# Patient Record
Sex: MALE | Race: WHITE | Employment: FULL TIME | URBAN - METROPOLITAN AREA
[De-identification: names, ages, dates, MRNs, and addresses within clinical notes are randomized per-mention and may not be internally consistent; named-entity substitution may affect disease eponyms.]

---

## 2019-12-02 ENCOUNTER — TELEPHONE (OUTPATIENT)
Dept: INTERNAL MEDICINE CLINIC | Facility: CLINIC | Age: 64
End: 2019-12-02

## 2019-12-02 NOTE — TELEPHONE ENCOUNTER
Patient calling to re establish with Dr Rodrigo Daly please advise if he will reestablish with this patient

## 2019-12-03 NOTE — TELEPHONE ENCOUNTER
Fine with me. Bear Junior. Amanda Jones MD  Diplomate, American Board of Internal Medicine  Member, American College of [address] [facility]  [address] [facility], [address], [facility], [address] [place] [address]  T: [id number]; F: [address]

## 2019-12-19 NOTE — TELEPHONE ENCOUNTER
Called patient to set up first appointment as new patient with DR Zion Lira to set up [facility] per Dr Rosamaria Holliday

## 2020-01-30 ENCOUNTER — OFFICE VISIT (OUTPATIENT)
Dept: INTERNAL MEDICINE CLINIC | Facility: CLINIC | Age: 65
End: 2020-01-30
Payer: COMMERCIAL

## 2020-01-30 VITALS
HEART RATE: 102 BPM | WEIGHT: 315 LBS | DIASTOLIC BLOOD PRESSURE: 100 MMHG | HEIGHT: 71.5 IN | SYSTOLIC BLOOD PRESSURE: 142 MMHG | BODY MASS INDEX: 43.13 KG/M2 | OXYGEN SATURATION: 98 % | RESPIRATION RATE: 18 BRPM | TEMPERATURE: 99 F

## 2020-01-30 DIAGNOSIS — Z13.6 SCREENING FOR HEART DISEASE: ICD-10-CM

## 2020-01-30 DIAGNOSIS — Z00.00 ROUTINE GENERAL MEDICAL EXAMINATION AT A HEALTH CARE FACILITY: Primary | ICD-10-CM

## 2020-01-30 DIAGNOSIS — R03.0 ELEVATED BLOOD PRESSURE READING WITHOUT DIAGNOSIS OF HYPERTENSION: ICD-10-CM

## 2020-01-30 DIAGNOSIS — E66.1 CLASS 3 DRUG-INDUCED OBESITY WITHOUT SERIOUS COMORBIDITY WITH BODY MASS INDEX (BMI) OF 45.0 TO 49.9 IN ADULT (HCC): ICD-10-CM

## 2020-01-30 DIAGNOSIS — Z12.12 SCREENING FOR COLORECTAL CANCER: ICD-10-CM

## 2020-01-30 DIAGNOSIS — Z12.11 SCREENING FOR COLORECTAL CANCER: ICD-10-CM

## 2020-01-30 DIAGNOSIS — Z12.5 SCREENING PSA (PROSTATE SPECIFIC ANTIGEN): ICD-10-CM

## 2020-01-30 DIAGNOSIS — I87.2 CHRONIC VENOUS INSUFFICIENCY: ICD-10-CM

## 2020-01-30 PROBLEM — E66.813 CLASS 3 DRUG-INDUCED OBESITY WITHOUT SERIOUS COMORBIDITY WITH BODY MASS INDEX (BMI) OF 45.0 TO 49.9 IN ADULT (HCC): Status: ACTIVE | Noted: 2020-01-30

## 2020-01-30 PROCEDURE — 93000 ELECTROCARDIOGRAM COMPLETE: CPT | Performed by: INTERNAL MEDICINE

## 2020-01-30 PROCEDURE — 99386 PREV VISIT NEW AGE 40-64: CPT | Performed by: INTERNAL MEDICINE

## 2020-01-30 NOTE — PROGRESS NOTES
Patient presents with:  Establish Care      HPI: Bebe Rooney is a 59 y/o WM, new patient here to establish PCP and to undergo male CPX. Last saw me in 2011. Health goals center around longevity, vitality, and QOL. Due for wellness labs.     Review of Systems drug-induced obesity without serious comorbidity with body mass index (bmi) of 45.0 to 49.9 in adult (hcc)  Elevated blood pressure reading without diagnosis of hypertension  Chronic venous insufficiency    1. Male CPX - Check wellness labs.   2. HM/Prev -

## 2020-02-05 ENCOUNTER — LAB ENCOUNTER (OUTPATIENT)
Dept: LAB | Age: 65
End: 2020-02-05
Attending: INTERNAL MEDICINE
Payer: COMMERCIAL

## 2020-02-05 DIAGNOSIS — Z00.00 ROUTINE GENERAL MEDICAL EXAMINATION AT A HEALTH CARE FACILITY: ICD-10-CM

## 2020-02-05 DIAGNOSIS — Z12.5 SCREENING PSA (PROSTATE SPECIFIC ANTIGEN): ICD-10-CM

## 2020-02-05 LAB
ALBUMIN SERPL-MCNC: 3.4 G/DL (ref 3.4–5)
ALBUMIN/GLOB SERPL: 0.9 {RATIO} (ref 1–2)
ALP LIVER SERPL-CCNC: 78 U/L (ref 45–117)
ALT SERPL-CCNC: 23 U/L (ref 16–61)
ANION GAP SERPL CALC-SCNC: 4 MMOL/L (ref 0–18)
AST SERPL-CCNC: 17 U/L (ref 15–37)
BASOPHILS # BLD AUTO: 0.1 X10(3) UL (ref 0–0.2)
BASOPHILS NFR BLD AUTO: 1.1 %
BILIRUB SERPL-MCNC: 0.3 MG/DL (ref 0.1–2)
BILIRUB UR QL STRIP.AUTO: NEGATIVE
BUN BLD-MCNC: 11 MG/DL (ref 7–18)
BUN/CREAT SERPL: 14.3 (ref 10–20)
CALCIUM BLD-MCNC: 9.1 MG/DL (ref 8.5–10.1)
CHLORIDE SERPL-SCNC: 106 MMOL/L (ref 98–112)
CHOLEST SMN-MCNC: 157 MG/DL (ref ?–200)
CLARITY UR REFRACT.AUTO: CLEAR
CO2 SERPL-SCNC: 30 MMOL/L (ref 21–32)
COLOR UR AUTO: YELLOW
COMPLEXED PSA SERPL-MCNC: 0.89 NG/ML (ref ?–4)
CREAT BLD-MCNC: 0.77 MG/DL (ref 0.7–1.3)
DEPRECATED RDW RBC AUTO: 48.4 FL (ref 35.1–46.3)
EOSINOPHIL # BLD AUTO: 0.32 X10(3) UL (ref 0–0.7)
EOSINOPHIL NFR BLD AUTO: 3.6 %
ERYTHROCYTE [DISTWIDTH] IN BLOOD BY AUTOMATED COUNT: 13.9 % (ref 11–15)
EST. AVERAGE GLUCOSE BLD GHB EST-MCNC: 183 MG/DL (ref 68–126)
GLOBULIN PLAS-MCNC: 4 G/DL (ref 2.8–4.4)
GLUCOSE BLD-MCNC: 107 MG/DL (ref 70–99)
GLUCOSE UR STRIP.AUTO-MCNC: NEGATIVE MG/DL
HBA1C MFR BLD HPLC: 8 % (ref ?–5.7)
HCT VFR BLD AUTO: 49 % (ref 39–53)
HDLC SERPL-MCNC: 33 MG/DL (ref 40–59)
HGB BLD-MCNC: 15.9 G/DL (ref 13–17.5)
IMM GRANULOCYTES # BLD AUTO: 0.05 X10(3) UL (ref 0–1)
IMM GRANULOCYTES NFR BLD: 0.6 %
KETONES UR STRIP.AUTO-MCNC: NEGATIVE MG/DL
LDLC SERPL CALC-MCNC: 84 MG/DL (ref ?–100)
LEUKOCYTE ESTERASE UR QL STRIP.AUTO: NEGATIVE
LYMPHOCYTES # BLD AUTO: 2.39 X10(3) UL (ref 1–4)
LYMPHOCYTES NFR BLD AUTO: 26.6 %
M PROTEIN MFR SERPL ELPH: 7.4 G/DL (ref 6.4–8.2)
MCH RBC QN AUTO: 30.7 PG (ref 26–34)
MCHC RBC AUTO-ENTMCNC: 32.4 G/DL (ref 31–37)
MCV RBC AUTO: 94.6 FL (ref 80–100)
MONOCYTES # BLD AUTO: 1.2 X10(3) UL (ref 0.1–1)
MONOCYTES NFR BLD AUTO: 13.4 %
NEUTROPHILS # BLD AUTO: 4.91 X10 (3) UL (ref 1.5–7.7)
NEUTROPHILS # BLD AUTO: 4.91 X10(3) UL (ref 1.5–7.7)
NEUTROPHILS NFR BLD AUTO: 54.7 %
NITRITE UR QL STRIP.AUTO: NEGATIVE
NONHDLC SERPL-MCNC: 124 MG/DL (ref ?–130)
OSMOLALITY SERPL CALC.SUM OF ELEC: 290 MOSM/KG (ref 275–295)
PATIENT FASTING Y/N/NP: YES
PATIENT FASTING Y/N/NP: YES
PH UR STRIP.AUTO: 5 [PH] (ref 4.5–8)
PLATELET # BLD AUTO: 229 10(3)UL (ref 150–450)
POTASSIUM SERPL-SCNC: 4.6 MMOL/L (ref 3.5–5.1)
PROT UR STRIP.AUTO-MCNC: 100 MG/DL
RBC # BLD AUTO: 5.18 X10(6)UL (ref 4.3–5.7)
RBC UR QL AUTO: NEGATIVE
SODIUM SERPL-SCNC: 140 MMOL/L (ref 136–145)
SP GR UR STRIP.AUTO: 1.02 (ref 1–1.03)
TRIGL SERPL-MCNC: 200 MG/DL (ref 30–149)
TSI SER-ACNC: 1.21 MIU/ML (ref 0.36–3.74)
UROBILINOGEN UR STRIP.AUTO-MCNC: <2 MG/DL
VIT D+METAB SERPL-MCNC: 11.7 NG/ML (ref 30–100)
VLDLC SERPL CALC-MCNC: 40 MG/DL (ref 0–30)
WBC # BLD AUTO: 9 X10(3) UL (ref 4–11)

## 2020-02-05 PROCEDURE — 36415 COLL VENOUS BLD VENIPUNCTURE: CPT | Performed by: INTERNAL MEDICINE

## 2020-02-05 PROCEDURE — 80050 GENERAL HEALTH PANEL: CPT | Performed by: INTERNAL MEDICINE

## 2020-02-05 PROCEDURE — 81001 URINALYSIS AUTO W/SCOPE: CPT | Performed by: INTERNAL MEDICINE

## 2020-02-05 PROCEDURE — 83036 HEMOGLOBIN GLYCOSYLATED A1C: CPT | Performed by: INTERNAL MEDICINE

## 2020-02-05 PROCEDURE — 82306 VITAMIN D 25 HYDROXY: CPT | Performed by: INTERNAL MEDICINE

## 2020-02-05 PROCEDURE — 84153 ASSAY OF PSA TOTAL: CPT | Performed by: INTERNAL MEDICINE

## 2020-02-05 PROCEDURE — 80061 LIPID PANEL: CPT | Performed by: INTERNAL MEDICINE

## 2020-02-21 ENCOUNTER — OFFICE VISIT (OUTPATIENT)
Dept: INTERNAL MEDICINE CLINIC | Facility: CLINIC | Age: 65
End: 2020-02-21
Payer: COMMERCIAL

## 2020-02-21 VITALS
HEIGHT: 71.5 IN | WEIGHT: 315 LBS | HEART RATE: 96 BPM | BODY MASS INDEX: 43.13 KG/M2 | SYSTOLIC BLOOD PRESSURE: 150 MMHG | DIASTOLIC BLOOD PRESSURE: 74 MMHG | TEMPERATURE: 99 F

## 2020-02-21 DIAGNOSIS — E11.9 NEW ONSET TYPE 2 DIABETES MELLITUS (HCC): Primary | ICD-10-CM

## 2020-02-21 PROCEDURE — 99214 OFFICE O/P EST MOD 30 MIN: CPT | Performed by: INTERNAL MEDICINE

## 2020-02-21 NOTE — PATIENT INSTRUCTIONS
General Information on Diabetes  Diabetes is a long-term health problem. It means your body doesn't make enough insulin. Or it may mean that your body can't use the insulin it makes. Insulin is a hormone in your body.  It lets blood sugar (glucose) reach · Try to reach your ideal weight. You may be able to cut back on or not have to take diabetes medicine if you eat the right foods and get exercise. · Don't smoke. Smoking worsens the effects of diabetes on your circulation.  You are much more likely to hav · Check your ketones often. Watch them more often if you are vomiting and having diarrhea. · Don't skip meals. Try to eat small meals on a regular schedule. Do this even if you don't feel like eating.   · Drink water or other liquids that don't have caffei · Urinating often  · Drowsiness  · Thirst  · Headache  · Nausea or vomiting  · Belly (abdominal) pain  · Eyesight changes  · Fast breathing  Also call your provider right away if you have any of these signs of low blood sugar and they don't go away with th

## 2020-02-21 NOTE — PROGRESS NOTES
Family History   Problem Relation Age of Onset   • Diabetes Mother    • Other (Other) Mother         Thyroid     Patient presents with:  Lab Results      HPI: Jayshree Wheatley presents today for abnormal labs, namely new-onset DM2.   A1c done recently w/ result a detailed. Handout given. Send to Diabetes Education. Push aggressive lifestyle measures. Will reassess labs in 3 months and decide on medication options at that time. Send to [facility] (Dr. Saul Alexander) and contact info given. 2. RTC 3 months.   Tot

## 2020-02-24 ENCOUNTER — OFFICE VISIT (OUTPATIENT)
Dept: SURGERY | Facility: CLINIC | Age: 65
End: 2020-02-24
Payer: COMMERCIAL

## 2020-02-24 VITALS
DIASTOLIC BLOOD PRESSURE: 80 MMHG | SYSTOLIC BLOOD PRESSURE: 151 MMHG | TEMPERATURE: 98 F | WEIGHT: 315 LBS | BODY MASS INDEX: 43.13 KG/M2 | HEART RATE: 97 BPM | RESPIRATION RATE: 17 BRPM | HEIGHT: 71.5 IN

## 2020-02-24 DIAGNOSIS — I87.2 CHRONIC VENOUS INSUFFICIENCY: ICD-10-CM

## 2020-02-24 DIAGNOSIS — R03.0 ELEVATED BLOOD PRESSURE READING WITHOUT DIAGNOSIS OF HYPERTENSION: ICD-10-CM

## 2020-02-24 DIAGNOSIS — Z12.11 ENCOUNTER FOR SCREENING COLONOSCOPY: Primary | ICD-10-CM

## 2020-02-24 DIAGNOSIS — E11.9 NEW ONSET TYPE 2 DIABETES MELLITUS (HCC): ICD-10-CM

## 2020-02-24 DIAGNOSIS — E66.1 CLASS 3 DRUG-INDUCED OBESITY WITHOUT SERIOUS COMORBIDITY WITH BODY MASS INDEX (BMI) OF 45.0 TO 49.9 IN ADULT (HCC): ICD-10-CM

## 2020-02-24 PROCEDURE — S0285 CNSLT BEFORE SCREEN COLONOSC: HCPCS | Performed by: COLON & RECTAL SURGERY

## 2020-02-24 RX ORDER — POLYETHYLENE GLYCOL 3350, SODIUM CHLORIDE, SODIUM BICARBONATE, POTASSIUM CHLORIDE 420; 11.2; 5.72; 1.48 G/4L; G/4L; G/4L; G/4L
POWDER, FOR SOLUTION ORAL
Qty: 1 BOTTLE | Refills: 0 | Status: SHIPPED | OUTPATIENT
Start: 2020-02-24 | End: 2020-05-22 | Stop reason: ALTCHOICE

## 2020-02-24 NOTE — H&P
New Patient Visit Note       Active Problems      1. Encounter for screening colonoscopy    2. New onset type 2 diabetes mellitus ([facility] [address]    3. Chronic venous insufficiency    4.  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 No current facility-administered medications on file prior to visit.          Review of Systems  Review range of motion. General: No edema. Lymphadenopathy:     He has no cervical adenopathy. Neurological: He is alert and oriented to person, place, and time. Skin: Skin is warm and dry. No rash noted. He is not diaphoretic.    Psychiatric: He has

## 2020-02-24 NOTE — PATIENT INSTRUCTIONS
Assessment   Encounter for screening colonoscopy  (primary encounter diagnosis)  New onset type 2 diabetes mellitus ([facility] [address]  Chronic venous insufficiency  Class 3 drug-induced obesity without serious comorbidity with body mass index (BMI) of 45.0 to 49.9 in

## 2020-03-06 ENCOUNTER — TELEPHONE (OUTPATIENT)
Dept: ENDOCRINOLOGY CLINIC | Facility: CLINIC | Age: 65
End: 2020-03-06

## 2020-03-06 ENCOUNTER — NURSE ONLY (OUTPATIENT)
Dept: ENDOCRINOLOGY CLINIC | Facility: CLINIC | Age: 65
End: 2020-03-06
Payer: COMMERCIAL

## 2020-03-06 VITALS — HEIGHT: 71.5 IN | WEIGHT: 315 LBS | BODY MASS INDEX: 43.13 KG/M2

## 2020-03-06 DIAGNOSIS — E11.65 TYPE 2 DIABETES MELLITUS WITH HYPERGLYCEMIA, WITHOUT LONG-TERM CURRENT USE OF INSULIN (HCC): Primary | ICD-10-CM

## 2020-03-06 PROCEDURE — G0108 DIAB MANAGE TRN  PER INDIV: HCPCS

## 2020-03-06 RX ORDER — BLOOD SUGAR DIAGNOSTIC
STRIP MISCELLANEOUS
Qty: 100 STRIP | Refills: 1 | Status: SHIPPED | OUTPATIENT
Start: 2020-03-06 | End: 2020-03-09

## 2020-03-06 RX ORDER — LANCETS 33 GAUGE
1 EACH MISCELLANEOUS 4 TIMES DAILY
Qty: 3 BOX | Refills: 11 | Status: SHIPPED | OUTPATIENT
Start: 2020-03-06 | End: 2020-05-22 | Stop reason: ALTCHOICE

## 2020-03-06 RX ORDER — FLASH GLUCOSE SENSOR
1 KIT MISCELLANEOUS
Qty: 6 EACH | Refills: 3 | Status: SHIPPED | OUTPATIENT
Start: 2020-03-06 | End: 2021-06-09

## 2020-03-06 NOTE — TELEPHONE ENCOUNTER
Please call [place] [phone]:     1) Does pt need Irwin reader  2) Does he also need O.T. Verio supplies and if so how many times/day to test?

## 2020-03-06 NOTE — PROGRESS NOTES
Julio Cesar Michel  : 1955 attended Step 1 Diabetic Education:    Date: 3/6/2020  Referring Provider: Alejandra Sosa  Start time: 10am End time: 11am    Ht 71.5\"   Wt (!) 324 lb (147 kg)   BMI 44.56 kg/m²     HgbA1C (%)   Date Value   2020 8.0 (H)

## 2020-03-09 ENCOUNTER — TELEPHONE (OUTPATIENT)
Dept: INTERNAL MEDICINE CLINIC | Facility: CLINIC | Age: 65
End: 2020-03-09

## 2020-03-09 DIAGNOSIS — E11.65 TYPE 2 DIABETES MELLITUS WITH HYPERGLYCEMIA, WITHOUT LONG-TERM CURRENT USE OF INSULIN (HCC): ICD-10-CM

## 2020-03-09 RX ORDER — BLOOD SUGAR DIAGNOSTIC
STRIP MISCELLANEOUS
Qty: 200 STRIP | Refills: 1 | Status: SHIPPED | OUTPATIENT
Start: 2020-03-09 | End: 2020-05-22 | Stop reason: ALTCHOICE

## 2020-03-25 ENCOUNTER — TELEPHONE (OUTPATIENT)
Dept: ENDOCRINOLOGY CLINIC | Facility: CLINIC | Age: 65
End: 2020-03-25

## 2020-03-25 NOTE — TELEPHONE ENCOUNTER
LM Step 2 Class is being canceled due to COVID-19. Patient added to call list to be rescheduled once classes resume.

## 2020-05-22 ENCOUNTER — OFFICE VISIT (OUTPATIENT)
Dept: INTERNAL MEDICINE CLINIC | Facility: CLINIC | Age: 65
End: 2020-05-22
Payer: COMMERCIAL

## 2020-05-22 VITALS
RESPIRATION RATE: 16 BRPM | DIASTOLIC BLOOD PRESSURE: 88 MMHG | SYSTOLIC BLOOD PRESSURE: 140 MMHG | TEMPERATURE: 99 F | HEIGHT: 71.5 IN | WEIGHT: 315 LBS | HEART RATE: 98 BPM | BODY MASS INDEX: 43.13 KG/M2 | OXYGEN SATURATION: 98 %

## 2020-05-22 DIAGNOSIS — R40.0 DAYTIME SOMNOLENCE: ICD-10-CM

## 2020-05-22 DIAGNOSIS — E11.9 CONTROLLED TYPE 2 DIABETES MELLITUS WITHOUT COMPLICATION, WITHOUT LONG-TERM CURRENT USE OF INSULIN (HCC): Primary | ICD-10-CM

## 2020-05-22 DIAGNOSIS — R06.83 PRIMARY SNORING: ICD-10-CM

## 2020-05-22 DIAGNOSIS — E66.1 CLASS 3 DRUG-INDUCED OBESITY WITHOUT SERIOUS COMORBIDITY WITH BODY MASS INDEX (BMI) OF 40.0 TO 44.9 IN ADULT (HCC): ICD-10-CM

## 2020-05-22 PROBLEM — E66.813 CLASS 3 DRUG-INDUCED OBESITY WITHOUT SERIOUS COMORBIDITY WITH BODY MASS INDEX (BMI) OF 40.0 TO 44.9 IN ADULT (HCC): Status: ACTIVE | Noted: 2020-01-30

## 2020-05-22 PROCEDURE — 99214 OFFICE O/P EST MOD 30 MIN: CPT | Performed by: INTERNAL MEDICINE

## 2020-05-22 NOTE — PROGRESS NOTES
Patient presents with: Follow - Up: 3-months for diabetes      HPI: Coleman Scheuermann presents for 3-month f/u diabetes. He's been compliant w/ prescription medication + lifestyle measures. Has had relatively good home BG control. Due for updated labs.     ROS: No CP mood/affect      A/P:  Controlled type 2 diabetes mellitus without complication, without long-term current use of insulin (hcc)  (primary encounter diagnosis)  Daytime somnolence  Primary snoring  Class 3 drug-induced obesity without serious comorbidity wi

## 2020-06-09 ENCOUNTER — OFFICE VISIT (OUTPATIENT)
Dept: SLEEP CENTER | Age: 65
End: 2020-06-09
Attending: INTERNAL MEDICINE
Payer: COMMERCIAL

## 2020-06-09 DIAGNOSIS — E11.9 CONTROLLED TYPE 2 DIABETES MELLITUS WITHOUT COMPLICATION, WITHOUT LONG-TERM CURRENT USE OF INSULIN (HCC): ICD-10-CM

## 2020-06-09 DIAGNOSIS — R06.83 PRIMARY SNORING: ICD-10-CM

## 2020-06-09 DIAGNOSIS — R40.0 DAYTIME SOMNOLENCE: ICD-10-CM

## 2020-06-09 PROCEDURE — 95806 SLEEP STUDY UNATT&RESP EFFT: CPT

## 2020-06-15 NOTE — PROCEDURES
[address] [address]       Accredited by the [facility] ([facility])    PATIENT'S NAME:        Yaz Velazquez  ATTENDING PHYSICIAN:   America Knott M.D. REFERRING PHYSICIAN:   MONTEZ Zaidi severe obstructive sleep apnea with associated oxyhemoglobin desaturations. RECOMMENDATIONS:    1.    Given the severity of sleep-disordered breathing, it is recommended the patient pursue definitive therapy by undergoing CPAP desensitization followed by

## 2020-07-23 ENCOUNTER — TELEPHONE (OUTPATIENT)
Dept: INTERNAL MEDICINE CLINIC | Facility: CLINIC | Age: 65
End: 2020-07-23

## 2020-07-23 NOTE — TELEPHONE ENCOUNTER
Abebe Ellison calling from Dr Keegan Jones office they would like most recent A1C + most recent office notes faxed to their office at f [phone]

## 2020-08-03 ENCOUNTER — TELEPHONE (OUTPATIENT)
Dept: INTERNAL MEDICINE CLINIC | Facility: CLINIC | Age: 65
End: 2020-08-03

## 2020-12-15 ENCOUNTER — OFFICE VISIT (OUTPATIENT)
Dept: INTERNAL MEDICINE CLINIC | Facility: CLINIC | Age: 65
End: 2020-12-15
Payer: COMMERCIAL

## 2020-12-15 VITALS
HEIGHT: 71.75 IN | SYSTOLIC BLOOD PRESSURE: 180 MMHG | WEIGHT: 315 LBS | DIASTOLIC BLOOD PRESSURE: 90 MMHG | BODY MASS INDEX: 43.13 KG/M2 | HEART RATE: 100 BPM | RESPIRATION RATE: 16 BRPM | TEMPERATURE: 99 F | OXYGEN SATURATION: 99 %

## 2020-12-15 DIAGNOSIS — Z00.00 LABORATORY EXAMINATION ORDERED AS PART OF A ROUTINE GENERAL MEDICAL EXAMINATION: ICD-10-CM

## 2020-12-15 DIAGNOSIS — I87.2 CHRONIC VENOUS INSUFFICIENCY: Primary | ICD-10-CM

## 2020-12-15 DIAGNOSIS — E55.9 VITAMIN D DEFICIENCY: ICD-10-CM

## 2020-12-15 DIAGNOSIS — I10 ESSENTIAL HYPERTENSION: ICD-10-CM

## 2020-12-15 DIAGNOSIS — E11.9 CONTROLLED TYPE 2 DIABETES MELLITUS WITHOUT COMPLICATION, WITHOUT LONG-TERM CURRENT USE OF INSULIN (HCC): ICD-10-CM

## 2020-12-15 PROCEDURE — 99214 OFFICE O/P EST MOD 30 MIN: CPT | Performed by: NURSE PRACTITIONER

## 2020-12-15 PROCEDURE — 3008F BODY MASS INDEX DOCD: CPT | Performed by: NURSE PRACTITIONER

## 2020-12-15 PROCEDURE — 3080F DIAST BP >= 90 MM HG: CPT | Performed by: NURSE PRACTITIONER

## 2020-12-15 PROCEDURE — 3077F SYST BP >= 140 MM HG: CPT | Performed by: NURSE PRACTITIONER

## 2020-12-15 NOTE — PROGRESS NOTES
CHIEF COMPLAINT:     Patient presents with:  Leg Pain: and skin concern located on lower legs. HPI:   Rosaroi Conde is a 63year old male here for pain and swelling in his legs. Pt states he has had this problem since the beginning of the year.  Hannah Rivera Cuff Size: large)   Pulse 100   Temp 98.8 °F (37.1 °C) (Oral)   Resp 16   Ht 5' 11.75\" (1.822 m)   Wt (!) 327 lb 12 oz (148.7 kg)   SpO2 99%   BMI 44.76 kg/m²   GENERAL: well developed, well nourished,in no apparent distress  SKIN: no rashes,no suspicious

## 2021-01-07 ENCOUNTER — LAB ENCOUNTER (OUTPATIENT)
Dept: LAB | Age: 66
End: 2021-01-07
Attending: NURSE PRACTITIONER
Payer: COMMERCIAL

## 2021-01-07 DIAGNOSIS — E11.9 NEW ONSET TYPE 2 DIABETES MELLITUS (HCC): ICD-10-CM

## 2021-01-07 DIAGNOSIS — E55.9 VITAMIN D DEFICIENCY: ICD-10-CM

## 2021-01-07 DIAGNOSIS — I10 ESSENTIAL HYPERTENSION: ICD-10-CM

## 2021-01-07 DIAGNOSIS — E11.9 CONTROLLED TYPE 2 DIABETES MELLITUS WITHOUT COMPLICATION, WITHOUT LONG-TERM CURRENT USE OF INSULIN (HCC): ICD-10-CM

## 2021-01-07 DIAGNOSIS — Z00.00 LABORATORY EXAMINATION ORDERED AS PART OF A ROUTINE GENERAL MEDICAL EXAMINATION: ICD-10-CM

## 2021-01-07 LAB
ALBUMIN SERPL-MCNC: 3.3 G/DL (ref 3.4–5)
ALBUMIN/GLOB SERPL: 1 {RATIO} (ref 1–2)
ALP LIVER SERPL-CCNC: 83 U/L
ALT SERPL-CCNC: 22 U/L
ANION GAP SERPL CALC-SCNC: 5 MMOL/L (ref 0–18)
AST SERPL-CCNC: 14 U/L (ref 15–37)
BASOPHILS # BLD AUTO: 0.08 X10(3) UL (ref 0–0.2)
BASOPHILS NFR BLD AUTO: 0.9 %
BILIRUB SERPL-MCNC: 0.3 MG/DL (ref 0.1–2)
BUN BLD-MCNC: 8 MG/DL (ref 7–18)
BUN/CREAT SERPL: 11.3 (ref 10–20)
CALCIUM BLD-MCNC: 8.9 MG/DL (ref 8.5–10.1)
CHLORIDE SERPL-SCNC: 102 MMOL/L (ref 98–112)
CHOLEST SMN-MCNC: 208 MG/DL (ref ?–200)
CO2 SERPL-SCNC: 29 MMOL/L (ref 21–32)
CREAT BLD-MCNC: 0.71 MG/DL
DEPRECATED RDW RBC AUTO: 43.5 FL (ref 35.1–46.3)
EOSINOPHIL # BLD AUTO: 0.4 X10(3) UL (ref 0–0.7)
EOSINOPHIL NFR BLD AUTO: 4.6 %
ERYTHROCYTE [DISTWIDTH] IN BLOOD BY AUTOMATED COUNT: 13.2 % (ref 11–15)
EST. AVERAGE GLUCOSE BLD GHB EST-MCNC: 206 MG/DL (ref 68–126)
GLOBULIN PLAS-MCNC: 3.4 G/DL (ref 2.8–4.4)
GLUCOSE BLD-MCNC: 225 MG/DL (ref 70–99)
HBA1C MFR BLD HPLC: 8.8 % (ref ?–5.7)
HCT VFR BLD AUTO: 44.3 %
HDLC SERPL-MCNC: 38 MG/DL (ref 40–59)
HGB BLD-MCNC: 15 G/DL
IMM GRANULOCYTES # BLD AUTO: 0.06 X10(3) UL (ref 0–1)
IMM GRANULOCYTES NFR BLD: 0.7 %
LDLC SERPL CALC-MCNC: 109 MG/DL (ref ?–100)
LYMPHOCYTES # BLD AUTO: 2.34 X10(3) UL (ref 1–4)
LYMPHOCYTES NFR BLD AUTO: 27.1 %
M PROTEIN MFR SERPL ELPH: 6.7 G/DL (ref 6.4–8.2)
MCH RBC QN AUTO: 30.4 PG (ref 26–34)
MCHC RBC AUTO-ENTMCNC: 33.9 G/DL (ref 31–37)
MCV RBC AUTO: 89.7 FL
MONOCYTES # BLD AUTO: 0.98 X10(3) UL (ref 0.1–1)
MONOCYTES NFR BLD AUTO: 11.4 %
NEUTROPHILS # BLD AUTO: 4.77 X10 (3) UL (ref 1.5–7.7)
NEUTROPHILS # BLD AUTO: 4.77 X10(3) UL (ref 1.5–7.7)
NEUTROPHILS NFR BLD AUTO: 55.3 %
NONHDLC SERPL-MCNC: 170 MG/DL (ref ?–130)
OSMOLALITY SERPL CALC.SUM OF ELEC: 287 MOSM/KG (ref 275–295)
PATIENT FASTING Y/N/NP: YES
PATIENT FASTING Y/N/NP: YES
PLATELET # BLD AUTO: 203 10(3)UL (ref 150–450)
POTASSIUM SERPL-SCNC: 4.6 MMOL/L (ref 3.5–5.1)
RBC # BLD AUTO: 4.94 X10(6)UL
SODIUM SERPL-SCNC: 136 MMOL/L (ref 136–145)
TRIGL SERPL-MCNC: 307 MG/DL (ref 30–149)
VIT D+METAB SERPL-MCNC: 9.6 NG/ML (ref 30–100)
VLDLC SERPL CALC-MCNC: 61 MG/DL (ref 0–30)
WBC # BLD AUTO: 8.6 X10(3) UL (ref 4–11)

## 2021-01-07 PROCEDURE — 83036 HEMOGLOBIN GLYCOSYLATED A1C: CPT

## 2021-01-07 PROCEDURE — 80061 LIPID PANEL: CPT

## 2021-01-07 PROCEDURE — 82306 VITAMIN D 25 HYDROXY: CPT

## 2021-01-07 PROCEDURE — 36415 COLL VENOUS BLD VENIPUNCTURE: CPT

## 2021-01-07 PROCEDURE — 85025 COMPLETE CBC W/AUTO DIFF WBC: CPT

## 2021-01-07 PROCEDURE — 3052F HG A1C>EQUAL 8.0%<EQUAL 9.0%: CPT | Performed by: FAMILY MEDICINE

## 2021-01-07 PROCEDURE — 80053 COMPREHEN METABOLIC PANEL: CPT

## 2021-03-20 DIAGNOSIS — Z23 NEED FOR VACCINATION: ICD-10-CM

## 2021-04-20 ENCOUNTER — OFFICE VISIT (OUTPATIENT)
Dept: INTERNAL MEDICINE CLINIC | Facility: CLINIC | Age: 66
End: 2021-04-20
Payer: COMMERCIAL

## 2021-04-20 VITALS
WEIGHT: 315 LBS | RESPIRATION RATE: 17 BRPM | BODY MASS INDEX: 42.66 KG/M2 | HEART RATE: 98 BPM | DIASTOLIC BLOOD PRESSURE: 94 MMHG | SYSTOLIC BLOOD PRESSURE: 168 MMHG | HEIGHT: 72 IN | TEMPERATURE: 99 F | OXYGEN SATURATION: 99 %

## 2021-04-20 DIAGNOSIS — Z00.00 LABORATORY EXAMINATION ORDERED AS PART OF A ROUTINE GENERAL MEDICAL EXAMINATION: ICD-10-CM

## 2021-04-20 DIAGNOSIS — E55.9 VITAMIN D DEFICIENCY: ICD-10-CM

## 2021-04-20 DIAGNOSIS — I10 ESSENTIAL HYPERTENSION: ICD-10-CM

## 2021-04-20 DIAGNOSIS — E11.65 UNCONTROLLED TYPE 2 DIABETES MELLITUS WITH HYPERGLYCEMIA (HCC): Primary | ICD-10-CM

## 2021-04-20 DIAGNOSIS — E66.1 CLASS 3 DRUG-INDUCED OBESITY WITH SERIOUS COMORBIDITY AND BODY MASS INDEX (BMI) OF 40.0 TO 44.9 IN ADULT (HCC): ICD-10-CM

## 2021-04-20 DIAGNOSIS — E78.00 PURE HYPERCHOLESTEROLEMIA: ICD-10-CM

## 2021-04-20 DIAGNOSIS — G47.30 SLEEP APNEA, UNSPECIFIED TYPE: ICD-10-CM

## 2021-04-20 DIAGNOSIS — Z00.00 ROUTINE GENERAL MEDICAL EXAMINATION AT A HEALTH CARE FACILITY: ICD-10-CM

## 2021-04-20 DIAGNOSIS — Z12.5 PROSTATE CANCER SCREENING: ICD-10-CM

## 2021-04-20 PROCEDURE — 3080F DIAST BP >= 90 MM HG: CPT | Performed by: NURSE PRACTITIONER

## 2021-04-20 PROCEDURE — 3008F BODY MASS INDEX DOCD: CPT | Performed by: NURSE PRACTITIONER

## 2021-04-20 PROCEDURE — 99214 OFFICE O/P EST MOD 30 MIN: CPT | Performed by: NURSE PRACTITIONER

## 2021-04-20 PROCEDURE — 99396 PREV VISIT EST AGE 40-64: CPT | Performed by: NURSE PRACTITIONER

## 2021-04-20 PROCEDURE — 3077F SYST BP >= 140 MM HG: CPT | Performed by: NURSE PRACTITIONER

## 2021-04-20 RX ORDER — ERGOCALCIFEROL 1.25 MG/1
50000 CAPSULE ORAL WEEKLY
Qty: 12 CAPSULE | Refills: 1 | Status: CANCELLED | OUTPATIENT
Start: 2021-04-20

## 2021-04-20 RX ORDER — METFORMIN HYDROCHLORIDE 500 MG/1
TABLET, EXTENDED RELEASE ORAL
Qty: 180 TABLET | Refills: 0 | Status: CANCELLED | OUTPATIENT
Start: 2021-04-20

## 2021-04-20 RX ORDER — ROSUVASTATIN CALCIUM 20 MG/1
20 TABLET, COATED ORAL NIGHTLY
Qty: 90 TABLET | Refills: 0 | Status: CANCELLED | OUTPATIENT
Start: 2021-04-20

## 2021-04-20 NOTE — PROGRESS NOTES
Fátima Quintana is a 63year old male who presents for a complete physical exam.   HPI:   Pt complains of none. The patient presents for recheck of his diabetes. Patient’s FBS have been less than 150 per [place]. Pt purchasing sensors out of pocket.  Has tar Component Value Date    HDL 38 (L) 01/07/2021    HDL 33 (L) 02/05/2020     Lab Results   Component Value Date     (H) 01/07/2021    LDL 84 02/05/2020     Lab Results   Component Value Date    AST 14 (L) 01/07/2021    AST 17 02/05/2020     Lab Resu well developed, well nourished,in no apparent distress  SKIN: no rashes,no suspicious lesions  HEENT: atraumatic, normocephalic,ears and throat are clear  EYES:PERRLA, EOMI, normal optic disk,conjunctiva are clear  NECK: supple,no adenopathy, no thyromegal MANAGEMENT 3 HRS    5. Pure hypercholesterolemia  Strongly encouraged statin therapy, ASCVD risk nearly 40%! Pt again refuses and will research further and consider. Is aware of risks of not starting medical therapy. - LIPID PANEL; Future    6.  Prostate

## 2021-04-20 NOTE — PATIENT INSTRUCTIONS
1. Review medications for diabetes including Metformin, and class known as EBZ-3'M (Ozempic, trulicity, Rybelsus). There are several other oral medications to be considered as well but where your current A1C is we need at least 2 agents.    2. Research th loss of toes, feet, or limbs. How to prevent complications  You can prevent these serious problems by managing your blood sugar, blood pressure, and cholesterol. This can help you feel better and stay healthy.  You can control diabetes by tracking your blo between beats. Both the systolic and diastolic pressures are recorded as mm Hg (millimeters of mercury). Blood pressure is grouped as normal, elevated, or stage 1 or stage 2 high blood pressure:   · Normal blood pressure.  Lower than 120/lower than 80  · Pressure    Metabolic syndrome is a set of 5 health factors that can lead to serious health problems. The factors greatly increase your risk for diabetes, heart attack, or stroke.  High blood pressure (hypertension) is one of the factors of metabolic syndro foods, and drink less alcohol. · Eating less salt (sodium). Salt can raise blood pressure. Try salt-free seasoning, or herbs and spices. Choose low-salt or no-salt snacks, deli meats, and canned foods. · Being more physically active.  Physical activity ca asleep during the day, and having problems with memory or concentration.    Risk factors for sleep apnea include:  · Being overweight  · Being a man, or a woman in menopause  · Smoking  · Using alcohol or sedating medicines  · Having enlarged structures in have certain health problems. These include high blood pressure, heart attack, stroke, and sexual dysfunction. If you have sleep apnea, talk with your healthcare provider about the best treatments for you.    Sammy last reviewed this educational content

## 2021-04-26 ENCOUNTER — LAB ENCOUNTER (OUTPATIENT)
Dept: LAB | Age: 66
End: 2021-04-26
Attending: FAMILY MEDICINE
Payer: COMMERCIAL

## 2021-04-26 DIAGNOSIS — Z12.5 PROSTATE CANCER SCREENING: ICD-10-CM

## 2021-04-26 DIAGNOSIS — E11.65 UNCONTROLLED TYPE 2 DIABETES MELLITUS WITH HYPERGLYCEMIA (HCC): ICD-10-CM

## 2021-04-26 DIAGNOSIS — Z00.00 LABORATORY EXAMINATION ORDERED AS PART OF A ROUTINE GENERAL MEDICAL EXAMINATION: ICD-10-CM

## 2021-04-26 DIAGNOSIS — E78.00 PURE HYPERCHOLESTEROLEMIA: ICD-10-CM

## 2021-04-26 PROCEDURE — 84153 ASSAY OF PSA TOTAL: CPT | Performed by: NURSE PRACTITIONER

## 2021-04-26 PROCEDURE — 80050 GENERAL HEALTH PANEL: CPT | Performed by: NURSE PRACTITIONER

## 2021-04-26 PROCEDURE — 3060F POS MICROALBUMINURIA REV: CPT | Performed by: FAMILY MEDICINE

## 2021-04-26 PROCEDURE — 82043 UR ALBUMIN QUANTITATIVE: CPT | Performed by: NURSE PRACTITIONER

## 2021-04-26 PROCEDURE — 83036 HEMOGLOBIN GLYCOSYLATED A1C: CPT | Performed by: NURSE PRACTITIONER

## 2021-04-26 PROCEDURE — 81001 URINALYSIS AUTO W/SCOPE: CPT | Performed by: NURSE PRACTITIONER

## 2021-04-26 PROCEDURE — 82570 ASSAY OF URINE CREATININE: CPT | Performed by: NURSE PRACTITIONER

## 2021-04-26 PROCEDURE — 3044F HG A1C LEVEL LT 7.0%: CPT | Performed by: FAMILY MEDICINE

## 2021-04-26 PROCEDURE — 80061 LIPID PANEL: CPT | Performed by: NURSE PRACTITIONER

## 2021-06-05 NOTE — TELEPHONE ENCOUNTER
Protocol passed  Medication(s) to Refill:   Requested Prescriptions     Pending Prescriptions Disp Refills   • LISINOPRIL 20 MG Oral Tab [Pharmacy Med Name: LISINOPRIL 20MG TABLETS] 30 tablet 0     Sig: TAKE 1 TABLET(20 MG) BY MOUTH DAILY       LOV: 4/20/2

## 2021-06-07 RX ORDER — LISINOPRIL 20 MG/1
TABLET ORAL
Qty: 30 TABLET | Refills: 0 | Status: SHIPPED | OUTPATIENT
Start: 2021-06-07 | End: 2021-06-22

## 2021-06-08 DIAGNOSIS — E11.65 TYPE 2 DIABETES MELLITUS WITH HYPERGLYCEMIA, WITHOUT LONG-TERM CURRENT USE OF INSULIN (HCC): ICD-10-CM

## 2021-06-09 RX ORDER — FLASH GLUCOSE SENSOR
KIT MISCELLANEOUS
Qty: 6 EACH | Refills: 3 | Status: SHIPPED | OUTPATIENT
Start: 2021-06-09

## 2021-06-09 NOTE — TELEPHONE ENCOUNTER
Continuous Blood Gluc Sensor (FREESTYLE GLENNY 14 DAY SENSOR) Does not apply Misc 6 each 3 3/6/2020    Si Units by Does not apply route every 14 (fourteen) days. LOV:   2021 Althea Rachel RTC 4 weeks  4.  Uncontrolled type 2 diabetes real

## 2021-06-14 ENCOUNTER — LAB ENCOUNTER (OUTPATIENT)
Dept: LAB | Age: 66
End: 2021-06-14
Attending: FAMILY MEDICINE
Payer: COMMERCIAL

## 2021-06-14 DIAGNOSIS — R97.20 ELEVATED PSA: ICD-10-CM

## 2021-06-14 PROCEDURE — 84153 ASSAY OF PSA TOTAL: CPT | Performed by: NURSE PRACTITIONER

## 2021-06-22 ENCOUNTER — OFFICE VISIT (OUTPATIENT)
Dept: INTERNAL MEDICINE CLINIC | Facility: CLINIC | Age: 66
End: 2021-06-22
Payer: COMMERCIAL

## 2021-06-22 VITALS
RESPIRATION RATE: 16 BRPM | HEART RATE: 90 BPM | SYSTOLIC BLOOD PRESSURE: 130 MMHG | OXYGEN SATURATION: 99 % | TEMPERATURE: 98 F | BODY MASS INDEX: 41.36 KG/M2 | WEIGHT: 305.38 LBS | DIASTOLIC BLOOD PRESSURE: 75 MMHG | HEIGHT: 72 IN

## 2021-06-22 DIAGNOSIS — Z12.11 COLON CANCER SCREENING: ICD-10-CM

## 2021-06-22 DIAGNOSIS — I10 ESSENTIAL HYPERTENSION: Primary | ICD-10-CM

## 2021-06-22 DIAGNOSIS — Z87.898 HISTORY OF ELEVATED PSA: ICD-10-CM

## 2021-06-22 PROCEDURE — 3008F BODY MASS INDEX DOCD: CPT | Performed by: NURSE PRACTITIONER

## 2021-06-22 PROCEDURE — 3078F DIAST BP <80 MM HG: CPT | Performed by: NURSE PRACTITIONER

## 2021-06-22 PROCEDURE — 3075F SYST BP GE 130 - 139MM HG: CPT | Performed by: NURSE PRACTITIONER

## 2021-06-22 PROCEDURE — 99213 OFFICE O/P EST LOW 20 MIN: CPT | Performed by: NURSE PRACTITIONER

## 2021-06-22 RX ORDER — LISINOPRIL 20 MG/1
20 TABLET ORAL DAILY
Qty: 90 TABLET | Refills: 1 | Status: SHIPPED | OUTPATIENT
Start: 2021-06-22 | End: 2022-01-21

## 2021-06-22 NOTE — PROGRESS NOTES
CHIEF COMPLAINT:     Patient presents with:  Lab Results: Follow up       HPI:   Devan Veliz is a 63year old male patient presents for recheck of his hypertension.  Pt has been taking medications as instructed, no medication side effects, home BP anjelica Breathing is non labored. CARDIO: RRR without murmur  EXTREMITIES: no cyanosis, clubbing or edema       ASSESSMENT AND PLAN:     1. Essential hypertension  - lisinopril 20 MG Oral Tab; Take 1 tablet (20 mg total) by mouth daily. Dispense: 90 tablet;  Refi

## 2021-07-28 ENCOUNTER — TELEPHONE (OUTPATIENT)
Dept: INTERNAL MEDICINE CLINIC | Facility: CLINIC | Age: 66
End: 2021-07-28

## 2021-07-28 NOTE — TELEPHONE ENCOUNTER
Sirisha Rodriguez from dr. Winston Metcalf is requesting to please fax her the last office notes and the most recent A1C results, pt has an appointment with dr. Trinity Dave on 08/02/21.  Fax # [phone]

## 2021-07-29 NOTE — TELEPHONE ENCOUNTER
Pt was referred to Dr Marian Melgar (ophthalmologist) by Dr Blayne Mcconnell in 2020.      He has not signed off of medical release form as we generally do not need to have one if we have referred pt to a specialist.     Since it was not you whom referred pt and was Dr Blayne Mcconnell

## 2021-07-29 NOTE — TELEPHONE ENCOUNTER
Who is Dr. Orestes Mercedes?  If pt is requesting records sent and he has signed release that is fine

## 2021-10-25 ENCOUNTER — OFFICE VISIT (OUTPATIENT)
Dept: INTERNAL MEDICINE CLINIC | Facility: CLINIC | Age: 66
End: 2021-10-25
Payer: COMMERCIAL

## 2021-10-25 VITALS
HEIGHT: 72 IN | HEART RATE: 87 BPM | TEMPERATURE: 98 F | WEIGHT: 276.63 LBS | RESPIRATION RATE: 19 BRPM | DIASTOLIC BLOOD PRESSURE: 70 MMHG | OXYGEN SATURATION: 97 % | SYSTOLIC BLOOD PRESSURE: 130 MMHG | BODY MASS INDEX: 37.47 KG/M2

## 2021-10-25 DIAGNOSIS — L98.9 SKIN LESIONS: ICD-10-CM

## 2021-10-25 DIAGNOSIS — E11.9 CONTROLLED TYPE 2 DIABETES MELLITUS WITHOUT COMPLICATION, WITHOUT LONG-TERM CURRENT USE OF INSULIN (HCC): Primary | ICD-10-CM

## 2021-10-25 DIAGNOSIS — N52.9 ERECTILE DYSFUNCTION, UNSPECIFIED ERECTILE DYSFUNCTION TYPE: ICD-10-CM

## 2021-10-25 DIAGNOSIS — I10 ESSENTIAL HYPERTENSION: ICD-10-CM

## 2021-10-25 DIAGNOSIS — E66.1 CLASS 3 DRUG-INDUCED OBESITY WITHOUT SERIOUS COMORBIDITY WITH BODY MASS INDEX (BMI) OF 40.0 TO 44.9 IN ADULT (HCC): ICD-10-CM

## 2021-10-25 DIAGNOSIS — Z87.898 HISTORY OF ELEVATED PSA: ICD-10-CM

## 2021-10-25 DIAGNOSIS — Z12.11 COLON CANCER SCREENING: ICD-10-CM

## 2021-10-25 PROBLEM — G47.33 OSA (OBSTRUCTIVE SLEEP APNEA): Status: ACTIVE | Noted: 2021-10-25

## 2021-10-25 PROCEDURE — 3078F DIAST BP <80 MM HG: CPT | Performed by: FAMILY MEDICINE

## 2021-10-25 PROCEDURE — 99215 OFFICE O/P EST HI 40 MIN: CPT | Performed by: FAMILY MEDICINE

## 2021-10-25 PROCEDURE — 3075F SYST BP GE 130 - 139MM HG: CPT | Performed by: FAMILY MEDICINE

## 2021-10-25 PROCEDURE — 3008F BODY MASS INDEX DOCD: CPT | Performed by: FAMILY MEDICINE

## 2021-10-25 RX ORDER — SILDENAFIL 50 MG/1
50 TABLET, FILM COATED ORAL
Qty: 10 TABLET | Refills: 1 | Status: SHIPPED | OUTPATIENT
Start: 2021-10-25 | End: 2021-12-20

## 2021-10-25 NOTE — PROGRESS NOTES
Faiza Alex is a 61year old male.   HPI:   New pt to me Ora Chen) who comes in for a med ck   In the last several months has cut down on the eating a lot ,walking   Is loosing weight    Feels better  No CP  No SOB        Does ck BS via sensor    Running (primary encounter diagnosis)  Plan: HEMOGLOBIN V6W, COMP METABOLIC PANEL (14)        The wt loss should help his #s a lot   Ck labs in Dec and proceed          (E66.1,  Z68.41) Class 3 drug-induced obesity without serious comorbidity with body mass index

## 2021-12-14 ENCOUNTER — LAB ENCOUNTER (OUTPATIENT)
Dept: LAB | Age: 66
End: 2021-12-14
Attending: FAMILY MEDICINE
Payer: COMMERCIAL

## 2021-12-14 DIAGNOSIS — Z87.898 HISTORY OF ELEVATED PSA: ICD-10-CM

## 2021-12-14 DIAGNOSIS — E11.9 CONTROLLED TYPE 2 DIABETES MELLITUS WITHOUT COMPLICATION, WITHOUT LONG-TERM CURRENT USE OF INSULIN (HCC): ICD-10-CM

## 2021-12-14 DIAGNOSIS — N52.9 ERECTILE DYSFUNCTION, UNSPECIFIED ERECTILE DYSFUNCTION TYPE: ICD-10-CM

## 2021-12-14 PROCEDURE — 80053 COMPREHEN METABOLIC PANEL: CPT | Performed by: FAMILY MEDICINE

## 2021-12-14 PROCEDURE — 83036 HEMOGLOBIN GLYCOSYLATED A1C: CPT | Performed by: FAMILY MEDICINE

## 2021-12-14 PROCEDURE — 84402 ASSAY OF FREE TESTOSTERONE: CPT | Performed by: FAMILY MEDICINE

## 2021-12-14 PROCEDURE — 84153 ASSAY OF PSA TOTAL: CPT | Performed by: FAMILY MEDICINE

## 2021-12-14 PROCEDURE — 84443 ASSAY THYROID STIM HORMONE: CPT | Performed by: FAMILY MEDICINE

## 2021-12-14 PROCEDURE — 84403 ASSAY OF TOTAL TESTOSTERONE: CPT | Performed by: FAMILY MEDICINE

## 2021-12-14 PROCEDURE — 3044F HG A1C LEVEL LT 7.0%: CPT | Performed by: FAMILY MEDICINE

## 2021-12-15 DIAGNOSIS — E87.5 SERUM POTASSIUM ELEVATED: Primary | ICD-10-CM

## 2021-12-15 DIAGNOSIS — E83.52 SERUM CALCIUM ELEVATED: ICD-10-CM

## 2021-12-20 ENCOUNTER — OFFICE VISIT (OUTPATIENT)
Dept: INTERNAL MEDICINE CLINIC | Facility: CLINIC | Age: 66
End: 2021-12-20
Payer: COMMERCIAL

## 2021-12-20 VITALS
TEMPERATURE: 98 F | HEIGHT: 72 IN | DIASTOLIC BLOOD PRESSURE: 80 MMHG | OXYGEN SATURATION: 96 % | BODY MASS INDEX: 37.93 KG/M2 | HEART RATE: 86 BPM | WEIGHT: 280 LBS | SYSTOLIC BLOOD PRESSURE: 135 MMHG | RESPIRATION RATE: 20 BRPM

## 2021-12-20 DIAGNOSIS — R05.9 COUGH: ICD-10-CM

## 2021-12-20 DIAGNOSIS — N52.9 ERECTILE DYSFUNCTION, UNSPECIFIED ERECTILE DYSFUNCTION TYPE: ICD-10-CM

## 2021-12-20 DIAGNOSIS — I10 ESSENTIAL HYPERTENSION: ICD-10-CM

## 2021-12-20 DIAGNOSIS — E83.52 HYPERCALCEMIA: ICD-10-CM

## 2021-12-20 DIAGNOSIS — L98.9 SKIN LESION: ICD-10-CM

## 2021-12-20 DIAGNOSIS — E66.1 CLASS 3 DRUG-INDUCED OBESITY WITHOUT SERIOUS COMORBIDITY WITH BODY MASS INDEX (BMI) OF 40.0 TO 44.9 IN ADULT (HCC): ICD-10-CM

## 2021-12-20 DIAGNOSIS — E87.5 HYPERKALEMIA: Primary | ICD-10-CM

## 2021-12-20 PROCEDURE — 3075F SYST BP GE 130 - 139MM HG: CPT | Performed by: FAMILY MEDICINE

## 2021-12-20 PROCEDURE — 3079F DIAST BP 80-89 MM HG: CPT | Performed by: FAMILY MEDICINE

## 2021-12-20 PROCEDURE — 90750 HZV VACC RECOMBINANT IM: CPT | Performed by: FAMILY MEDICINE

## 2021-12-20 PROCEDURE — 90471 IMMUNIZATION ADMIN: CPT | Performed by: FAMILY MEDICINE

## 2021-12-20 PROCEDURE — 99214 OFFICE O/P EST MOD 30 MIN: CPT | Performed by: FAMILY MEDICINE

## 2021-12-20 PROCEDURE — 3008F BODY MASS INDEX DOCD: CPT | Performed by: FAMILY MEDICINE

## 2021-12-20 RX ORDER — SILDENAFIL 100 MG/1
100 TABLET, FILM COATED ORAL
Qty: 10 TABLET | Refills: 1 | Status: SHIPPED | OUTPATIENT
Start: 2021-12-20

## 2021-12-20 NOTE — PROGRESS NOTES
Noemi Briceno is a 61year old male.   HPI:   Here to go over his labs from earlier in the month     Has appt w Dr Destiny Gaines for Cscope next month  Would like another Derm as other was not available until June   The viagra 50 did help but not as well as he e (L98.9) Skin lesion  Plan: DERM - INTERNAL        To Dr Ariana Goncalves       (N52.9) Erectile dysfunction, unspecified erectile dysfunction type  Plan: increased to viagra 100        (R05.9) Cough  Plan: discussed ACE cough    Wishes to follow    Shingrix

## 2022-01-13 ENCOUNTER — TELEPHONE (OUTPATIENT)
Dept: INTERNAL MEDICINE CLINIC | Facility: CLINIC | Age: 67
End: 2022-01-13

## 2022-01-13 PROBLEM — N52.8 OTHER MALE ERECTILE DYSFUNCTION: Status: ACTIVE | Noted: 2022-01-13

## 2022-01-13 NOTE — TELEPHONE ENCOUNTER
Incoming fax from [place] requesting PA to be done for patients Sildenafil 100mg    Placed paperwork in  in basket for review

## 2022-01-21 DIAGNOSIS — I10 ESSENTIAL HYPERTENSION: ICD-10-CM

## 2022-01-21 RX ORDER — LISINOPRIL 20 MG/1
TABLET ORAL
Qty: 90 TABLET | Refills: 0 | Status: SHIPPED | OUTPATIENT
Start: 2022-01-21

## 2022-01-21 NOTE — TELEPHONE ENCOUNTER
Name from pharmacy: LISINOPRIL 20MG TABLETS          Will file in chart as: LISINOPRIL 20 MG Oral Tab    Sig: TAKE 1 TABLET(20 MG) BY MOUTH DAILY    Disp:  90 tablet    Refills:  1 (Pharmacy requested: Not specified)    Start: 1/21/2022    Class: Normal

## 2022-02-28 RX ORDER — FLASH GLUCOSE SENSOR
1 KIT MISCELLANEOUS
Qty: 6 EACH | Refills: 3 | Status: SHIPPED | OUTPATIENT
Start: 2022-02-28

## 2022-02-28 RX ORDER — SILDENAFIL 100 MG/1
100 TABLET, FILM COATED ORAL
Qty: 10 TABLET | Refills: 1 | Status: SHIPPED | OUTPATIENT
Start: 2022-02-28

## 2022-05-23 ENCOUNTER — TELEPHONE (OUTPATIENT)
Dept: INTERNAL MEDICINE CLINIC | Facility: CLINIC | Age: 67
End: 2022-05-23

## 2022-05-23 NOTE — TELEPHONE ENCOUNTER
Incoming fax from [facility] Dermatology    Patients OV notes from 5/23/2022    Placed in  in basket for review

## 2022-07-13 ENCOUNTER — TELEPHONE (OUTPATIENT)
Dept: INTERNAL MEDICINE CLINIC | Facility: CLINIC | Age: 67
End: 2022-07-13

## 2023-05-11 RX ORDER — SILDENAFIL 100 MG/1
100 TABLET, FILM COATED ORAL
Qty: 10 TABLET | Refills: 1 | Status: SHIPPED | OUTPATIENT
Start: 2023-05-11

## 2023-05-11 NOTE — TELEPHONE ENCOUNTER
Future Appointments   Date Time Provider Gina Jacquelyn   5/17/2023  8:30 AM Wicho Hills MD [facility] [place]

## 2023-05-17 ENCOUNTER — LAB ENCOUNTER (OUTPATIENT)
Dept: LAB | Age: 68
End: 2023-05-17
Attending: FAMILY MEDICINE
Payer: COMMERCIAL

## 2023-05-17 ENCOUNTER — OFFICE VISIT (OUTPATIENT)
Dept: INTERNAL MEDICINE CLINIC | Facility: CLINIC | Age: 68
End: 2023-05-17
Payer: COMMERCIAL

## 2023-05-17 VITALS
BODY MASS INDEX: 41.47 KG/M2 | WEIGHT: 306.19 LBS | HEIGHT: 72 IN | HEART RATE: 87 BPM | DIASTOLIC BLOOD PRESSURE: 86 MMHG | TEMPERATURE: 97 F | OXYGEN SATURATION: 95 % | SYSTOLIC BLOOD PRESSURE: 136 MMHG | RESPIRATION RATE: 16 BRPM

## 2023-05-17 DIAGNOSIS — E11.9 CONTROLLED TYPE 2 DIABETES MELLITUS WITHOUT COMPLICATION, WITHOUT LONG-TERM CURRENT USE OF INSULIN (HCC): ICD-10-CM

## 2023-05-17 DIAGNOSIS — Z71.85 VACCINE COUNSELING: ICD-10-CM

## 2023-05-17 DIAGNOSIS — Z87.898 HISTORY OF ELEVATED PSA: ICD-10-CM

## 2023-05-17 DIAGNOSIS — M25.561 CHRONIC PAIN OF BOTH KNEES: ICD-10-CM

## 2023-05-17 DIAGNOSIS — I10 ESSENTIAL HYPERTENSION: Primary | ICD-10-CM

## 2023-05-17 DIAGNOSIS — L98.9 SKIN LESION: ICD-10-CM

## 2023-05-17 DIAGNOSIS — E66.1 CLASS 3 DRUG-INDUCED OBESITY WITHOUT SERIOUS COMORBIDITY WITH BODY MASS INDEX (BMI) OF 40.0 TO 44.9 IN ADULT (HCC): ICD-10-CM

## 2023-05-17 DIAGNOSIS — Z12.11 COLON CANCER SCREENING: ICD-10-CM

## 2023-05-17 DIAGNOSIS — G89.29 CHRONIC PAIN OF BOTH KNEES: ICD-10-CM

## 2023-05-17 DIAGNOSIS — I10 ESSENTIAL HYPERTENSION: ICD-10-CM

## 2023-05-17 DIAGNOSIS — M25.562 CHRONIC PAIN OF BOTH KNEES: ICD-10-CM

## 2023-05-17 LAB
ALBUMIN SERPL-MCNC: 3.6 G/DL (ref 3.4–5)
ALBUMIN/GLOB SERPL: 0.9 {RATIO} (ref 1–2)
ALP LIVER SERPL-CCNC: 68 U/L
ALT SERPL-CCNC: 22 U/L
ANION GAP SERPL CALC-SCNC: 3 MMOL/L (ref 0–18)
AST SERPL-CCNC: 13 U/L (ref 15–37)
BILIRUB SERPL-MCNC: 0.4 MG/DL (ref 0.1–2)
BUN BLD-MCNC: 12 MG/DL (ref 7–18)
CALCIUM BLD-MCNC: 9.3 MG/DL (ref 8.5–10.1)
CHLORIDE SERPL-SCNC: 107 MMOL/L (ref 98–112)
CHOLEST SERPL-MCNC: 175 MG/DL (ref ?–200)
CO2 SERPL-SCNC: 28 MMOL/L (ref 21–32)
COMPLEXED PSA SERPL-MCNC: 5.43 NG/ML (ref ?–4)
CREAT BLD-MCNC: 0.79 MG/DL
CREAT UR-SCNC: 148 MG/DL
EST. AVERAGE GLUCOSE BLD GHB EST-MCNC: 131 MG/DL (ref 68–126)
FASTING PATIENT LIPID ANSWER: YES
FASTING STATUS PATIENT QL REPORTED: YES
GFR SERPLBLD BASED ON 1.73 SQ M-ARVRAT: 99 ML/MIN/1.73M2 (ref 60–?)
GLOBULIN PLAS-MCNC: 3.9 G/DL (ref 2.8–4.4)
GLUCOSE BLD-MCNC: 103 MG/DL (ref 70–99)
HBA1C MFR BLD: 6.2 % (ref ?–5.7)
HDLC SERPL-MCNC: 44 MG/DL (ref 40–59)
LDLC SERPL CALC-MCNC: 98 MG/DL (ref ?–100)
MICROALBUMIN UR-MCNC: 28.3 MG/DL
MICROALBUMIN/CREAT 24H UR-RTO: 191.2 UG/MG (ref ?–30)
NONHDLC SERPL-MCNC: 131 MG/DL (ref ?–130)
OSMOLALITY SERPL CALC.SUM OF ELEC: 286 MOSM/KG (ref 275–295)
POTASSIUM SERPL-SCNC: 4.2 MMOL/L (ref 3.5–5.1)
PROT SERPL-MCNC: 7.5 G/DL (ref 6.4–8.2)
SODIUM SERPL-SCNC: 138 MMOL/L (ref 136–145)
TRIGL SERPL-MCNC: 190 MG/DL (ref 30–149)
VLDLC SERPL CALC-MCNC: 32 MG/DL (ref 0–30)

## 2023-05-17 PROCEDURE — 3075F SYST BP GE 130 - 139MM HG: CPT | Performed by: FAMILY MEDICINE

## 2023-05-17 PROCEDURE — 84153 ASSAY OF PSA TOTAL: CPT | Performed by: FAMILY MEDICINE

## 2023-05-17 PROCEDURE — 82570 ASSAY OF URINE CREATININE: CPT | Performed by: FAMILY MEDICINE

## 2023-05-17 PROCEDURE — 90750 HZV VACC RECOMBINANT IM: CPT | Performed by: FAMILY MEDICINE

## 2023-05-17 PROCEDURE — 90471 IMMUNIZATION ADMIN: CPT | Performed by: FAMILY MEDICINE

## 2023-05-17 PROCEDURE — 99215 OFFICE O/P EST HI 40 MIN: CPT | Performed by: FAMILY MEDICINE

## 2023-05-17 PROCEDURE — 80053 COMPREHEN METABOLIC PANEL: CPT | Performed by: FAMILY MEDICINE

## 2023-05-17 PROCEDURE — 82043 UR ALBUMIN QUANTITATIVE: CPT | Performed by: FAMILY MEDICINE

## 2023-05-17 PROCEDURE — 3008F BODY MASS INDEX DOCD: CPT | Performed by: FAMILY MEDICINE

## 2023-05-17 PROCEDURE — 3079F DIAST BP 80-89 MM HG: CPT | Performed by: FAMILY MEDICINE

## 2023-05-17 PROCEDURE — 80061 LIPID PANEL: CPT | Performed by: FAMILY MEDICINE

## 2023-05-17 PROCEDURE — 83036 HEMOGLOBIN GLYCOSYLATED A1C: CPT | Performed by: FAMILY MEDICINE

## 2023-05-17 RX ORDER — VALSARTAN 80 MG/1
80 TABLET ORAL DAILY
Qty: 90 TABLET | Refills: 0 | Status: SHIPPED | OUTPATIENT
Start: 2023-05-17

## 2023-06-28 ENCOUNTER — OFFICE VISIT (OUTPATIENT)
Dept: SURGERY | Facility: CLINIC | Age: 68
End: 2023-06-28

## 2023-06-28 DIAGNOSIS — R97.20 ELEVATED PSA: Primary | ICD-10-CM

## 2023-06-28 DIAGNOSIS — R82.90 URINE FINDING: ICD-10-CM

## 2023-06-28 DIAGNOSIS — N52.8 OTHER MALE ERECTILE DYSFUNCTION: ICD-10-CM

## 2023-06-28 LAB
APPEARANCE: CLEAR
BILIRUBIN: NEGATIVE
GLUCOSE (URINE DIPSTICK): NEGATIVE MG/DL
KETONES (URINE DIPSTICK): NEGATIVE MG/DL
LEUKOCYTES: NEGATIVE
MULTISTIX LOT#: ABNORMAL NUMERIC
NITRITE, URINE: NEGATIVE
OCCULT BLOOD: NEGATIVE
PH, URINE: 7 (ref 4.5–8)
PROTEIN (URINE DIPSTICK): 30 MG/DL
SPECIFIC GRAVITY: 1.02 (ref 1–1.03)
URINE-COLOR: YELLOW
UROBILINOGEN,SEMI-QN: 0.2 MG/DL (ref 0–1.9)

## 2023-06-28 PROCEDURE — 99204 OFFICE O/P NEW MOD 45 MIN: CPT

## 2023-06-28 PROCEDURE — 81003 URINALYSIS AUTO W/O SCOPE: CPT

## 2023-08-29 RX ORDER — VALSARTAN 80 MG/1
80 TABLET ORAL DAILY
Qty: 90 TABLET | Refills: 0 | Status: SHIPPED | OUTPATIENT
Start: 2023-08-29

## 2023-12-12 RX ORDER — SILDENAFIL 100 MG/1
100 TABLET, FILM COATED ORAL
Qty: 10 TABLET | Refills: 1 | Status: SHIPPED | OUTPATIENT
Start: 2023-12-12

## 2023-12-12 RX ORDER — VALSARTAN 80 MG/1
80 TABLET ORAL DAILY
Qty: 90 TABLET | Refills: 0 | Status: SHIPPED | OUTPATIENT
Start: 2023-12-12

## 2023-12-12 NOTE — TELEPHONE ENCOUNTER
Sildenafil 100 mg  Filled 5-11-22  Qty 6  3 refills  No future appointments. LOV 5-17-23 TO    Valsartan 80 mg  Filled 8-29-23  Qty 90  0 refills  No future appointments.   LOV 5-17-23 TO

## 2024-02-27 DIAGNOSIS — E11.65 TYPE 2 DIABETES MELLITUS WITH HYPERGLYCEMIA, WITHOUT LONG-TERM CURRENT USE OF INSULIN (HCC): ICD-10-CM

## 2024-02-29 RX ORDER — FLASH GLUCOSE SENSOR
1 KIT MISCELLANEOUS
Qty: 6 EACH | Refills: 3 | OUTPATIENT
Start: 2024-02-29

## 2024-05-06 RX ORDER — SILDENAFIL 100 MG/1
100 TABLET, FILM COATED ORAL
Qty: 10 TABLET | Refills: 1 | Status: SHIPPED | OUTPATIENT
Start: 2024-05-06

## 2024-05-06 NOTE — TELEPHONE ENCOUNTER
Requesting   Name from pharmacy: SILDENAFIL 100MG TABLETS          Will file in chart as: SILDENAFIL CITRATE 100 MG Oral Tab    Sig: TAKE 1 TABLET(100 MG) BY MOUTH DAILY AS NEEDED FOR ERECTILE DYSFUNCTION    Disp: 10 tablet    Refills: 1 (Pharmacy requested: Not specified)    Start: 5/4/2024    Class: Normal    Non-formulary    Last ordered: 4 months ago (12/12/2023) by Joe Lima MD    Last refill: 2/27/2024    Rx #: [id number]    Genitourinary Medications [id number]/2024 04:57 PM   Protocol Details Patient does not have pulmonary hypertension on problem list    In person appointment or virtual visit in the past 12 mos or appointment in next 3 mos        LOV: 5/17/2023  RTC: 3 months  Last Relevant Labs: n/a   Filled: 12/12/2023 #10 with 1 refills    No future appointments.

## 2024-11-06 ENCOUNTER — HOSPITAL ENCOUNTER (OUTPATIENT)
Age: 69
Discharge: HOME | End: 2024-11-06
Payer: COMMERCIAL

## 2024-11-06 VITALS
DIASTOLIC BLOOD PRESSURE: 95 MMHG | HEART RATE: 96 BPM | BODY MASS INDEX: 42.66 KG/M2 | HEIGHT: 72 IN | TEMPERATURE: 98 F | OXYGEN SATURATION: 99 % | SYSTOLIC BLOOD PRESSURE: 157 MMHG | WEIGHT: 315 LBS | RESPIRATION RATE: 18 BRPM

## 2024-11-06 DIAGNOSIS — L03.032 PARONYCHIA OF TOE OF LEFT FOOT: Primary | ICD-10-CM

## 2024-11-06 PROCEDURE — 99213 OFFICE O/P EST LOW 20 MIN: CPT

## 2024-11-06 PROCEDURE — 10060 I&D ABSCESS SIMPLE/SINGLE: CPT

## 2024-11-06 PROCEDURE — 99204 OFFICE O/P NEW MOD 45 MIN: CPT

## 2024-11-06 RX ORDER — CEFADROXIL 500 MG/1
500 CAPSULE ORAL 2 TIMES DAILY
Qty: 20 CAPSULE | Refills: 0 | Status: SHIPPED | OUTPATIENT
Start: 2024-11-06 | End: 2024-11-16

## 2024-11-06 NOTE — ED PROVIDER NOTES
Patient Seen in: [facility]      History     Chief Complaint   Patient presents with    Cellulitis     Stated Complaint: left foot toe issue    Subjective:   69-year-old male presents today with redness pain swelling to the left third toe.  No injury to the toe no injury to the nail.  Did notice a collection of pus under the skin recently.  No other symptoms or concerns.  The patient's medication list, past medical history and social history elements as listed in today's nurse's notes were reviewed and agreed (except as otherwise stated in the HPI).  The patient's family history reviewed and determined to be noncontributory to the presenting problem              Objective:     Past Medical History:    Obesity    PIERO (obstructive sleep apnea)    AHI 54 [address] [place] 64%              Past Surgical History:   Procedure Laterality Date    [place] teeth removed  1980                Social History     Socioeconomic History    Marital status:    Tobacco Use    Smoking status: Never     Passive exposure: Never    Smokeless tobacco: Never   Vaping Use    Vaping status: Never Used   Substance and Sexual Activity    Alcohol use: Yes     Comment: 2 drinks / week    Drug use: Never   Other Topics Concern    Caffeine Concern Yes     Comment: 2-3 cups daily    Exercise No              Review of Systems    Positive for stated complaint: left foot toe issue  Other systems are as noted in HPI.  Constitutional and vital signs reviewed.      All other systems reviewed and negative except as noted above.    Physical Exam     ED Triage Vitals [11/06/24 1703]   BP (!) 157/95   Pulse 96   Resp 18   Temp 98.2 °F (36.8 °C)   Temp src Oral   SpO2 99 %   O2 Device None (Room air)       Current Vitals:   Vital Signs  BP: (!) 157/95  Pulse: 96  Resp: 18  Temp: 98.2 °F (36.8 °C)  Temp src: Oral    Oxygen Therapy  SpO2: 99 %  O2 Device: None (Room air)        Physical Exam  Vitals and nursing note reviewed.   Constitutional:        Appearance: Normal appearance.   HENT:      Head: Normocephalic.      Mouth/Throat:      Mouth: Mucous membranes are moist.   Cardiovascular:      Rate and Rhythm: Normal rate.   Pulmonary:      Effort: Pulmonary effort is normal.   Musculoskeletal:      Comments: Swelling redness with pustule to the cuticle of the left third toe.   Skin:     General: Skin is warm and dry.   Neurological:      Mental Status: He is alert and oriented to person, place, and time.             ED Course   Labs Reviewed - No data to display                MDM     Please note that this report has been produced using speech recognition software and may contain errors related to that system including, but not limited to, errors in grammar, punctuation, and spelling, as well as words and phrases that possibly may have been recognized inappropriately.  If there are any questions or concerns, contact the dictating provider for clarification.              Medical Decision Making  Differential diagnosis includes but is not limited to: Paronychia, cellulitis, naheed      Presents today with paronychia to the left third toe with pustule.  I&D was done see procedure note.  Will start patient on Duricef to take as directed.  Toe care was instructed.  Follow-up primary care physician 1 week for reevaluation.  Go directly to the ER with any worsening symptoms.  Patient verbalized understanding and agreed to plan of care.    Procedure note  Area cleaned using Betadine swab  11 blade scalpel was used for drainage  Small amount of pus and blood out  Antibiotic ointment was applied with Band-Aid.      Risk  OTC drugs.  Prescription drug management.        Disposition and Plan     Clinical Impression:  1. Paronychia of toe of left foot         Disposition:  Discharge  11/6/2024  5:37 pm    Follow-up:  Joe Lima MD  [address] [address]  [phone]    In 1 week  for recheck          Medications Prescribed:  Current  Discharge Medication List        START taking these medications    Details   cefadroxil 500 MG Oral Cap Take 1 capsule (500 mg total) by mouth 2 (two) times daily for 10 days.  Qty: 20 capsule, Refills: 0                 Supplementary Documentation:

## 2024-12-26 ENCOUNTER — OFFICE VISIT (OUTPATIENT)
Dept: INTERNAL MEDICINE CLINIC | Facility: CLINIC | Age: 69
End: 2024-12-26
Payer: COMMERCIAL

## 2024-12-26 ENCOUNTER — LAB ENCOUNTER (OUTPATIENT)
Dept: LAB | Age: 69
End: 2024-12-26
Attending: FAMILY MEDICINE
Payer: COMMERCIAL

## 2024-12-26 VITALS
TEMPERATURE: 98 F | HEART RATE: 110 BPM | OXYGEN SATURATION: 93 % | SYSTOLIC BLOOD PRESSURE: 150 MMHG | WEIGHT: 297.19 LBS | DIASTOLIC BLOOD PRESSURE: 95 MMHG | HEIGHT: 71.34 IN | BODY MASS INDEX: 41.15 KG/M2

## 2024-12-26 DIAGNOSIS — E11.9 CONTROLLED TYPE 2 DIABETES MELLITUS WITHOUT COMPLICATION, WITHOUT LONG-TERM CURRENT USE OF INSULIN (HCC): ICD-10-CM

## 2024-12-26 DIAGNOSIS — Z00.00 LABORATORY EXAM ORDERED AS PART OF ROUTINE GENERAL MEDICAL EXAMINATION: ICD-10-CM

## 2024-12-26 DIAGNOSIS — Z87.898 HISTORY OF ELEVATED PSA: ICD-10-CM

## 2024-12-26 DIAGNOSIS — Z00.00 WELL ADULT EXAM: Primary | ICD-10-CM

## 2024-12-26 DIAGNOSIS — Z12.11 COLON CANCER SCREENING: ICD-10-CM

## 2024-12-26 DIAGNOSIS — B35.9 TINEA: ICD-10-CM

## 2024-12-26 DIAGNOSIS — I10 ESSENTIAL HYPERTENSION: ICD-10-CM

## 2024-12-26 LAB
ALBUMIN SERPL-MCNC: 4.4 G/DL (ref 3.2–4.8)
ALBUMIN/GLOB SERPL: 1.3 {RATIO} (ref 1–2)
ALP LIVER SERPL-CCNC: 95 U/L
ALT SERPL-CCNC: 23 U/L
ANION GAP SERPL CALC-SCNC: 15 MMOL/L (ref 0–18)
AST SERPL-CCNC: 17 U/L (ref ?–34)
BASOPHILS # BLD AUTO: 0.07 X10(3) UL (ref 0–0.2)
BASOPHILS NFR BLD AUTO: 0.9 %
BILIRUB SERPL-MCNC: 0.5 MG/DL (ref 0.2–1.1)
BILIRUB UR QL STRIP.AUTO: NEGATIVE
BUN BLD-MCNC: 10 MG/DL (ref 9–23)
CALCIUM BLD-MCNC: 10 MG/DL (ref 8.7–10.4)
CHLORIDE SERPL-SCNC: 96 MMOL/L (ref 98–112)
CHOLEST SERPL-MCNC: 591 MG/DL (ref ?–200)
CLARITY UR REFRACT.AUTO: CLEAR
CO2 SERPL-SCNC: 21 MMOL/L (ref 21–32)
COLOR UR AUTO: YELLOW
COMPLEXED PSA SERPL-MCNC: 1.06 NG/ML (ref ?–4)
CREAT BLD-MCNC: 0.91 MG/DL
CREAT UR-SCNC: 108.5 MG/DL
EGFRCR SERPLBLD CKD-EPI 2021: 92 ML/MIN/1.73M2 (ref 60–?)
EOSINOPHIL # BLD AUTO: 0.39 X10(3) UL (ref 0–0.7)
EOSINOPHIL NFR BLD AUTO: 4.8 %
ERYTHROCYTE [DISTWIDTH] IN BLOOD BY AUTOMATED COUNT: 12.6 %
EST. AVERAGE GLUCOSE BLD GHB EST-MCNC: 398 MG/DL (ref 68–126)
FASTING PATIENT LIPID ANSWER: YES
FASTING STATUS PATIENT QL REPORTED: YES
GLOBULIN PLAS-MCNC: 3.3 G/DL (ref 2–3.5)
GLUCOSE BLD-MCNC: 378 MG/DL (ref 70–99)
GLUCOSE UR STRIP.AUTO-MCNC: >1000 MG/DL
HBA1C MFR BLD: 15.5 % (ref ?–5.7)
HCT VFR BLD AUTO: 48.4 %
HDLC SERPL-MCNC: 26 MG/DL (ref 40–59)
HGB BLD-MCNC: 16.5 G/DL
HYALINE CASTS #/AREA URNS AUTO: PRESENT /LPF
IMM GRANULOCYTES # BLD AUTO: 0.08 X10(3) UL (ref 0–1)
IMM GRANULOCYTES NFR BLD: 1 %
KETONES UR STRIP.AUTO-MCNC: NEGATIVE MG/DL
LDLC SERPL DIRECT ASSAY-MCNC: 110 MG/DL (ref ?–100)
LEUKOCYTE ESTERASE UR QL STRIP.AUTO: NEGATIVE
LYMPHOCYTES # BLD AUTO: 2.04 X10(3) UL (ref 1–4)
LYMPHOCYTES NFR BLD AUTO: 25.1 %
MCH RBC QN AUTO: 30.5 PG (ref 26–34)
MCHC RBC AUTO-ENTMCNC: 34.1 G/DL (ref 31–37)
MCV RBC AUTO: 89.5 FL
MICROALBUMIN UR-MCNC: 62 MG/DL
MICROALBUMIN/CREAT 24H UR-RTO: 571.4 UG/MG (ref ?–30)
MONOCYTES # BLD AUTO: 1.04 X10(3) UL (ref 0.1–1)
MONOCYTES NFR BLD AUTO: 12.8 %
NEUTROPHILS # BLD AUTO: 4.52 X10 (3) UL (ref 1.5–7.7)
NEUTROPHILS # BLD AUTO: 4.52 X10(3) UL (ref 1.5–7.7)
NEUTROPHILS NFR BLD AUTO: 55.4 %
NITRITE UR QL STRIP.AUTO: NEGATIVE
NONHDLC SERPL-MCNC: 565 MG/DL (ref ?–130)
OSMOLALITY SERPL CALC.SUM OF ELEC: 289 MOSM/KG (ref 275–295)
PH UR STRIP.AUTO: 5.5 [PH] (ref 5–8)
PLATELET # BLD AUTO: 184 10(3)UL (ref 150–450)
POTASSIUM SERPL-SCNC: 4.9 MMOL/L (ref 3.5–5.1)
PROT SERPL-MCNC: 7.7 G/DL (ref 5.7–8.2)
PROT UR STRIP.AUTO-MCNC: 100 MG/DL
RBC # BLD AUTO: 5.41 X10(6)UL
RBC UR QL AUTO: NEGATIVE
SODIUM SERPL-SCNC: 132 MMOL/L (ref 136–145)
SP GR UR STRIP.AUTO: >1.03 (ref 1–1.03)
TRIGL SERPL-MCNC: 1514 MG/DL (ref 30–149)
UROBILINOGEN UR STRIP.AUTO-MCNC: NORMAL MG/DL
WBC # BLD AUTO: 8.1 X10(3) UL (ref 4–11)

## 2024-12-26 PROCEDURE — 81001 URINALYSIS AUTO W/SCOPE: CPT | Performed by: FAMILY MEDICINE

## 2024-12-26 PROCEDURE — 83036 HEMOGLOBIN GLYCOSYLATED A1C: CPT | Performed by: FAMILY MEDICINE

## 2024-12-26 PROCEDURE — G0103 PSA SCREENING: HCPCS | Performed by: FAMILY MEDICINE

## 2024-12-26 PROCEDURE — 83721 ASSAY OF BLOOD LIPOPROTEIN: CPT | Performed by: FAMILY MEDICINE

## 2024-12-26 PROCEDURE — 82570 ASSAY OF URINE CREATININE: CPT | Performed by: FAMILY MEDICINE

## 2024-12-26 PROCEDURE — 80053 COMPREHEN METABOLIC PANEL: CPT | Performed by: FAMILY MEDICINE

## 2024-12-26 PROCEDURE — 82043 UR ALBUMIN QUANTITATIVE: CPT | Performed by: FAMILY MEDICINE

## 2024-12-26 PROCEDURE — 80061 LIPID PANEL: CPT | Performed by: FAMILY MEDICINE

## 2024-12-26 PROCEDURE — 85025 COMPLETE CBC W/AUTO DIFF WBC: CPT | Performed by: FAMILY MEDICINE

## 2024-12-26 RX ORDER — VALSARTAN 80 MG/1
80 TABLET ORAL DAILY
Qty: 90 TABLET | Refills: 0 | Status: SHIPPED | OUTPATIENT
Start: 2024-12-26

## 2024-12-26 NOTE — PROGRESS NOTES
Jatin Mcgraw is a 69 year old male who presents for a complete physical exam.   HPI:       Wt Readings from Last 6 Encounters:   12/26/24 297 lb 3.2 oz (134.8 kg)   11/06/24 (!) 335 lb (152 kg)   05/17/23 (!) 306 lb 3.2 oz (138.9 kg)   12/20/21 280 lb (127 kg)   10/25/21 276 lb 9.6 oz (125.5 kg)   06/22/21 (!) 305 lb 6.4 oz (138.5 kg)     Body mass index is 41.06 kg/m².     Cholesterol, Total (mg/dL)   Date Value   05/17/2023 175   04/26/2021 130   01/07/2021 208 (H)     HDL Cholesterol (mg/dL)   Date Value   05/17/2023 44   04/26/2021 32 (L)   01/07/2021 38 (L)     LDL Cholesterol (mg/dL)   Date Value   05/17/2023 98   04/26/2021 69   01/07/2021 109 (H)     AST (U/L)   Date Value   05/17/2023 13 (L)   12/14/2021 15   04/26/2021 22     ALT (U/L)   Date Value   05/17/2023 22   12/14/2021 28   04/26/2021 33      Current Outpatient Medications   Medication Sig Dispense Refill    VALSARTAN 80 MG Oral Tab TAKE 1 TABLET(80 MG) BY MOUTH DAILY (Patient not taking: Reported on 12/26/2024) 90 tablet 0    Continuous Blood Gluc Sensor ([place]kSTYLE GLENNY 14 DAY SENSOR) Does not apply Misc 1 each every 14 (fourteen) days. (Patient not taking: Reported on 12/26/2024) 6 each 3      Past Medical History:    Diabetes (HCC)    Obesity    PIERO (obstructive sleep apnea)    AHI 54 [address] [place] 64%      Past Surgical History:   Procedure Laterality Date    [place] teeth removed  1980      Family History   Problem Relation Age of Onset    Diabetes Mother     Other (Other) Mother         Thyroid      Social History:  Social History     Socioeconomic History    Marital status:    Tobacco Use    Smoking status: Never     Passive exposure: Never    Smokeless tobacco: Never   Vaping Use    Vaping status: Never Used   Substance and Sexual Activity    Alcohol use: Yes     Alcohol/week: 3.0 standard drinks of alcohol     Types: 2 Glasses of wine, 1 Cans of beer per week     Comment: 2 drinks / week    Drug use: Never   Other Topics Concern     Caffeine Concern No    Exercise Yes    Weight Concern Yes      .        REVIEW OF SYSTEMS:   GENERAL: feels well otherwise    has been busy w new job   has neglected his health he feels   out of the BP meds     SKIN: denies rash  HEENT: denies nasal congestion, sinus pain or ST  LUNGS: denies shortness of breath  CARDIOVASCULAR: denies chest pain on exertion  GI: denies abdominal pain  : denies dysuria but has freq   , urgency     has swollen foreskin and harder to retract     NEURO: denies headaches  PSYCHE: denies depression or anxiety  HEMATOLOGIC: denies hx of anemia  ENDOCRINE: denies thyroid history  ALL/ASTHMA: denies  asthma    EXAM:   BP (!) 147/96 (BP Location: Left arm, Patient Position: Sitting, Cuff Size: large)   Pulse 110   Temp 97.5 °F (36.4 °C) (Temporal)   Ht 5' 11.34\" (1.812 m)   Wt 297 lb 3.2 oz (134.8 kg)   SpO2 93%   BMI 41.06 kg/m²   Body mass index is 41.06 kg/m².   GENERAL: well developed, well nourished,in no apparent distress  SKIN: no rashes,  HEENT: atraumatic, normocephalic,ears and throat are clear  NECK: supple,no adenopathy  LUNGS: clear to auscultation  CARDIO: RRR without murmur  GI: good BS's,no masses, HSM or tenderness    tinea   EXTREMITIES: Bilateral barefoot skin diabetic exam is normal, visualized feet and the appearance is normal.  Bilateral monofilament/sensation of both feet is normal.  Pulsation pedal pulse exam of both lower legs/feet is normal as well.  NEURO: motor and sensory are grossly intact    ASSESSMENT AND PLAN:   Jatin Mcgraw is a 69 year old male who presents for a complete physical exam.  Health maintenance, discussed importance of close f/u   will check fasting Lipids, CMP, CBC and UA. Check A1c    check PSA     saw urology in the past    PSA has been high    await DM labs    had eye check last week      needs to see yearly   flu and prevnar 20 today       Needs cscope  Dr Gonsalves    to use otc lotrimin for tinea    Valsartan refilled    sam  2 mo BP     The patient indicates understanding of these issues and agrees to the plan.  The patient is asked to return for CPX in 1yr.

## 2024-12-27 ENCOUNTER — TELEPHONE (OUTPATIENT)
Dept: INTERNAL MEDICINE CLINIC | Facility: CLINIC | Age: 69
End: 2024-12-27

## 2024-12-27 NOTE — TELEPHONE ENCOUNTER
Dear Mariya please see Dr. Lima lab results for this patient. Is there any way we can get this patient scheduled ASAP with you or another diabetes educator? Below is Dr. Lima comment on patients labs on 12/26.    Absolutely terrible A1c, hence his urine s/s as well as dry mouth, weight loss     Please call Mariya to see if they squeeze him in in near future for appt (or someone else) in the DM clinic      Most likely needs insulin, but needs education, sensor  Kidneys are showing damage from the high sugars    His cholesterol and TG are terribly high as well       Take the valsartan we ordered      Rec crestor 10mg at bedtime #90 to see if helps lower the lipid levels but may need injectable agent for this as well    Rec metformin 500mg  to start to lower the sugars but it alone is not the answer

## 2024-12-30 NOTE — TELEPHONE ENCOUNTER
Call placed to patient, scheduled appointment with Mariya .    Patient is in the middle of work right now and asks we call back tomorrow to schedule with KATARINA for education.    Your Appointments      Thursday January 09, 2025 8:45 AM  APN New Patient Visit with MICKY Armenta  [facility] [address], [place] ([facility] [place]) [address]  [facility] [id number] [phone]        Monday March 10, 2025 9:45 AM  Exam - Established with Jeo Lima MD  [facility], [facility] ([facility]) [address] [facility] [address]  Count includes the [facility] [id number] [phone]             [facility]

## 2025-01-02 RX ORDER — METFORMIN HYDROCHLORIDE 500 MG/1
500 TABLET, EXTENDED RELEASE ORAL 2 TIMES DAILY WITH MEALS
Qty: 180 TABLET | Refills: 0 | OUTPATIENT
Start: 2025-01-02

## 2025-01-02 RX ORDER — METFORMIN HYDROCHLORIDE 500 MG/1
500 TABLET, EXTENDED RELEASE ORAL 2 TIMES DAILY WITH MEALS
Qty: 60 TABLET | Refills: 0 | Status: SHIPPED | OUTPATIENT
Start: 2025-01-02

## 2025-01-02 NOTE — TELEPHONE ENCOUNTER
Spoke with pt. Informed him of Metformin ER being sent in. Pt had not started the regular Metformin. So he will start the Metformin ER today. He knows not to wait till appts to start it.

## 2025-01-02 NOTE — TELEPHONE ENCOUNTER
Appointment scheduled.    Future Appointments   Date Time Provider Department Center   1/15/2025 10:00 AM Nicolasa Nelson, [facility]     Patient is concerned about metformin side effects/diarrhea. Would like to know if he can hold off on starting Metformin until his upcoming appointment with MICKY Armenta because he has some traveling coming up. He is traveling 01/16/2025-end of the month.

## 2025-01-09 ENCOUNTER — OFFICE VISIT (OUTPATIENT)
Dept: ENDOCRINOLOGY CLINIC | Facility: CLINIC | Age: 70
End: 2025-01-09
Payer: COMMERCIAL

## 2025-01-09 VITALS
HEART RATE: 74 BPM | WEIGHT: 294 LBS | RESPIRATION RATE: 18 BRPM | DIASTOLIC BLOOD PRESSURE: 72 MMHG | SYSTOLIC BLOOD PRESSURE: 136 MMHG | BODY MASS INDEX: 41 KG/M2 | OXYGEN SATURATION: 98 %

## 2025-01-09 DIAGNOSIS — I10 PRIMARY HYPERTENSION: ICD-10-CM

## 2025-01-09 DIAGNOSIS — E78.1 PURE HYPERTRIGLYCERIDEMIA: ICD-10-CM

## 2025-01-09 DIAGNOSIS — E66.1 CLASS 3 DRUG-INDUCED OBESITY WITHOUT SERIOUS COMORBIDITY WITH BODY MASS INDEX (BMI) OF 40.0 TO 44.9 IN ADULT (HCC): ICD-10-CM

## 2025-01-09 DIAGNOSIS — E11.65 TYPE 2 DIABETES MELLITUS WITH HYPERGLYCEMIA, WITHOUT LONG-TERM CURRENT USE OF INSULIN (HCC): Primary | ICD-10-CM

## 2025-01-09 DIAGNOSIS — E66.813 CLASS 3 DRUG-INDUCED OBESITY WITHOUT SERIOUS COMORBIDITY WITH BODY MASS INDEX (BMI) OF 40.0 TO 44.9 IN ADULT (HCC): ICD-10-CM

## 2025-01-09 PROBLEM — E78.2 MIXED HYPERLIPIDEMIA: Status: ACTIVE | Noted: 2025-01-09

## 2025-01-09 PROBLEM — E11.9 CONTROLLED TYPE 2 DIABETES MELLITUS WITHOUT COMPLICATION, WITHOUT LONG-TERM CURRENT USE OF INSULIN (HCC): Status: RESOLVED | Noted: 2020-05-22 | Resolved: 2025-01-09

## 2025-01-09 PROCEDURE — 3078F DIAST BP <80 MM HG: CPT | Performed by: NURSE PRACTITIONER

## 2025-01-09 PROCEDURE — 99215 OFFICE O/P EST HI 40 MIN: CPT | Performed by: NURSE PRACTITIONER

## 2025-01-09 PROCEDURE — 95251 CONT GLUC MNTR ANALYSIS I&R: CPT | Performed by: NURSE PRACTITIONER

## 2025-01-09 PROCEDURE — 3075F SYST BP GE 130 - 139MM HG: CPT | Performed by: NURSE PRACTITIONER

## 2025-01-09 RX ORDER — SEMAGLUTIDE 0.68 MG/ML
0.25 INJECTION, SOLUTION SUBCUTANEOUS WEEKLY
Qty: 3 ML | Refills: 0 | COMMUNITY
Start: 2025-01-09

## 2025-01-09 RX ORDER — HYDROCHLOROTHIAZIDE 12.5 MG/1
1 CAPSULE ORAL
Qty: 6 EACH | Refills: 3 | Status: SHIPPED | OUTPATIENT
Start: 2025-01-09

## 2025-01-09 NOTE — PROGRESS NOTES
HPI:   Jatin Mcgraw is a 69 year old male who presents for initial visit with provider for the management of his diabetes.     Chief Complaint   Patient presents with    Diabetes     Est care-irwin        Diabetes: needs improvement  Type: 2   Duration: 2020  Current Meds: Metformin ER 500mg po bid - patient hasn't started medication  *Patient hesitant to start medication, does not like taking pills  Long term insulin use: n/a  Failed Meds: denies    Complications: PVD, PIERO    Personal [employer]estyle Irwin 14 day CGM  Analysis of data: 2024 - 2024  Sensor download: full report  in media  Average glucose : 197 mg/dl   GMI: 8%    CV (coefficient of variation) : 38.1%     27% time above 180mg /dl   24% time above 250 mg/dl  49% time in target range:  mg/dl   0% time below target range: 70mg/dl     Evaluation   1. Significant improvement from week of  to week of   2. Patient's baseline glucose is currently stable and in target range  3. Some postprandial elevations but return to baseline         Overall glucose control:   HGBA1C:    Lab Results   Component Value Date    A1C 15.5 (H) 2024    A1C 6.2 (H) 2023    A1C 5.7 (H) 2021     (H) 2024       Hypertension: needs improvement  Blood Pressure: 136/72   Medication prescribed: valsartan  SE: denies     Hyperlipidemia: uncontrolled  LDL: -   Tri  Medication prescribed: rosuvastatin  SE: denies     Wt Readings from Last 3 Encounters:   25 294 lb (133.4 kg)   24 297 lb 3.2 oz (134.8 kg)   24 (!) 335 lb (152 kg)     BP Readings from Last 3 Encounters:   25 136/72   24 (!) 150/95   24 (!) 157/95          Past History:   He  has a past medical history of Diabetes (HCC) (2017?), Obesity, and PIERO (obstructive sleep apnea) (2020 [facility]).   His family history includes Diabetes in his mother; Other in his mother.   He  reports that he has never smoked. He has never been exposed  to tobacco smoke. He has never used smokeless tobacco. He reports current alcohol use of about 3.0 standard drinks of alcohol per week. He reports that he does not use drugs.     He is allergic to penicillins and lisinopril.     Current Outpatient Medications on File Prior to Visit   Medication Sig    rosuvastatin (CRESTOR) 10 MG Oral Tab Take 1 tablet (10 mg total) by mouth nightly.    valsartan 80 MG Oral Tab Take 1 tablet (80 mg total) by mouth daily.     No current facility-administered medications on file prior to visit.       CMP  (most recent labs)   Lab Results   Component Value Date/Time     (H) 12/26/2024 01:53 PM    BUN 10 12/26/2024 01:53 PM    CREATSERUM 0.91 12/26/2024 01:53 PM    GFRNAA 95 12/14/2021 08:47 AM    GFRAA 110 12/14/2021 08:47 AM    EGFRCR 92 12/26/2024 01:53 PM    CA 10.0 12/26/2024 01:53 PM    ALKPHO 95 12/26/2024 01:53 PM    AST 17 12/26/2024 01:53 PM    ALT 23 12/26/2024 01:53 PM    BILT 0.5 12/26/2024 01:53 PM    TP 7.7 12/26/2024 01:53 PM    ALB 4.4 12/26/2024 01:53 PM     (L) 12/26/2024 01:53 PM    K 4.9 12/26/2024 01:53 PM    CL 96 (L) 12/26/2024 01:53 PM    CO2 21.0 12/26/2024 01:53 PM           Lipids  (most recent labs)   Lab Results   Component Value Date/Time    CHOLEST 591 (H) 12/26/2024 01:53 PM    TRIG 1,514 (H) 12/26/2024 01:53 PM    HDL 26 (L) 12/26/2024 01:53 PM    LDL  12/26/2024 01:53 PM      Comment:      Unable to calculate LDL due to Triglycerides >800 mg/dL        Optimal            <100 mg/dL   Near/Above OptimaL 100-129 mg/dL   Borderline High    130-159 mg/dL   High               160-189 mg/dL    Very High          >190 mg/dL         NONHDLC 565 (H) 12/26/2024 01:53 PM          Diabetes  (most recent labs)   Lab Results   Component Value Date/Time    A1C 15.5 (H) 12/26/2024 01:53 PM          Microalb (most recent labs)   Lab Results   Component Value Date/Time    MICROALBCREA 571.4 (H) 12/26/2024 01:53 PM        Lab results reviewed with  patient.    REVIEW OF SYSTEMS:   GENERAL HEALTH: feels well otherwise  SKIN: denies any unusual skin lesions or rashes  RESPIRATORY: denies shortness of breath with exertion  CARDIOVASCULAR: denies chest pain on exertion  GI: denies nausea, abdominal pain or heartburn  NEURO: denies headaches and denies numbness and tingling to extremities  ENDO: c/o recent polydipsia, polyuria or polyphagia but has improved and is not longer present, admits to recent unexplained weight loss    EXAM:   /72   Pulse 74   Resp 18   Wt 294 lb (133.4 kg)   SpO2 98%   BMI 40.62 kg/m²  Estimated body mass index is 40.62 kg/m² as calculated from the following:    Height as of 12/26/24: 5' 11.34\" (1.812 m).    Weight as of this encounter: 294 lb (133.4 kg).   Physical Exam  Vitals reviewed.   Constitutional:       Appearance: Normal appearance.   Cardiovascular:      Pulses:           Dorsalis pedis pulses are 2+ on the right side and 2+ on the left side.   Pulmonary:      Effort: Pulmonary effort is normal.   Feet:      Right foot:      Protective Sensation: 5 sites tested.  5 sites sensed.      Skin integrity: Dry skin present.      Toenail Condition: Right toenails are normal.      Left foot:      Protective Sensation: 5 sites tested.  5 sites sensed.      Skin integrity: Dry skin present.      Toenail Condition: Left toenails are normal.      Comments: Diabetes foot exam performed: Vibration to dorsum to the first toe perceived bilaterally.  Foot care practices reviewed with patient.    Neurological:      Mental Status: He is alert and oriented to person, place, and time.   Psychiatric:         Mood and Affect: Mood normal.         Behavior: Behavior normal.         ASSESSMENT AND PLAN:   As for his Diabetes, it is poorly controlled, needs further observation, needs improvement, needs to follow diet more regularly  Recommendations are: lose weight by increased dietary compliance and exercise, see ophthalmology soon, and check  feet daily.    Discussed adding GLP-1 to current medication regimen.  Discussed action, risk vs benefit, dosing, and potential side effects.  Patient denies hx of pancreatitis, gastroparesis, or personal or family hx of medullary thyroid CA or MEN 2.     Patient to start Ozempic 0.25mg injection once weekly x 4 weeks then if tolerating increase Ozempic to 0.5mg injection weekly.  Patient to remain off of Metformin at this time per patient request.    Instructed patient on Ozempic pen use, dosing with the pen, pen needles, site selection for injections, rotation and injection administration.  Handouts give for patient reference     Per ADA guidelines, in patients with type 2 diabetes and established ASCVD, incorporating agent proven to reduce major adverse CV events and CV mortality   GLP-1 Ozempic rx chosen since it not only lowers A1C, but studies have shown it can also reduce the risk of major CV events such as heart attack, stroke, or death in adults with type 2 diabetes who are currently treating their CV disease.  Patient to continue to use personal CGM for glucose monitoring.  Prescription for updated Freestyle Irwin 3+ sent to pharmacy.  Discussed self monitoring blood glucose and the importance of monitoring.  Patient agreed to monitoring bid - fasting and 2 hours postprandial of one meal per day if not using CGM.  Reinforced healthy eating in healthy portions and increasing daily physical activity.  Patient scheduled to meet with dietitian next week.  Discussed pathophysiology of type 2 DM in detail.  Overview of complications of uncontrolled diabetes including foot, eye, dental, renal and lipid monitoring, and skin care reviewed.  Reviewed ways to improve the protein in the urine:   Keep blood sugars in target range   Keep blood pressure in target range   Watch over the counter medicines like NSAID (non steroidal anti-inflammatory drugs) these can be harmful to  kidneys (examples include: , Motrin , Advil,  ibuprofen, naprosyn, Aleve)   Continue ARB therapy - renoprotective         As for his hypertension, Blood Pressure is stable, no significant medication side effects noted, needs further observation, needs improvement, needs to follow diet more regularly.   PLAN: will continue present medications, reviewed diet, exercise and weight control, continue ARB therapy.     As for his cholesterol, Lipids are no significant medication side effects noted, poorly controlled, needs further observation, needs improvement, needs to follow diet more regularly.   PLAN: will continue present medications, reviewed diet, exercise and weight control, continue statin therapy that was recently restarted.  Discussed adding Omega 3 supplements in diet, controlling glucose levels, decreasing carbohydrate intake and increasing physical activity.      DM Health Maintenance  Last dilated eye exam: No data recorded Exam shows retinopathy? No data recorded  Last diabetic foot exam: Last Foot Exam: 25    Date of last PHQ-2 depression screen: PHQ-2 - Date of last depression screenin2025    Patient  reports that he has never smoked. He has never been exposed to tobacco smoke. He has never used smokeless tobacco.  When is flu vaccine due? No recommendations at this time  When is pneumonia vaccine due? No recommendations at this time    The patient indicates understanding of these issues and agrees to the plan.  Refills addressed at time of office visit.    Diagnoses and all orders for this visit:    Type 2 diabetes mellitus with hyperglycemia, without long-term current use of insulin (HCC)  -     Continuous Glucose Sensor (FREESTYLE GLENNY 3 PLUS SENSOR) Does not apply Misc; 1 each every 15 (fifteen) days.  -     semaglutide (OZEMPIC, 0.25 OR 0.5 MG/DOSE,) 2 MG/3ML Subcutaneous Solution Pen-injector; Inject 0.25 mg into the skin once a week. 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    The risks and benefits of my recommendations, as well as other treatment options were discussed with the patient today. Questions were answered to the best of my knowledge.   55 min spent with patient and >50% time spent counseling and coordinating care related to their office visit.      Mariya WEEKS, BC-ADM, [facility]

## 2025-01-09 NOTE — PATIENT INSTRUCTIONS
We are here to support you with Diabetes but please remember that you still need your primary care doctor for your routine health maintenance.   Your current A1C: 15.5%  This test provides us with your average blood sugar for the past 3 months.   The main goal of diabetes treatment is to keep your sugar from going too high. We measure your overall blood sugar trends with a Hemoglobin A1C test. (also called an A1C)  For most people the target is less than 7.0% but sometimes we make exceptions based on age, health history and other factors.   Keeping an A1C less than 7% helps prevents diabetes related health problems.   If your A1C goes too high, then we need to talk about changing your current diabetes treatment.    MEDICATIONS:   It is important to take all of your medications as prescribed.   Please call me if you cannot get the prescriptions filled or are having issues with refills.   Also, please call me if you have any issues with medication questions, side effects, dosing questions or problems with your blood sugar trends BEFORE CHANGING OR STOPPING ANY MEDICATIONS.   Start Ozempic 0.25mg injection once weekly x 4 weeks then if tolerating increase Ozempic to 0.5mg injection weekly    Blood sugar testing:   Always bring your glucose meter or blood sugar logbook to every appointment here at the diabetes center.  This allows me to safely make adjustments to your diabetes plan.   In order for me to determine any patterns in your blood sugars, you will need to use personal Freestyle Irwin 3 CGM or test your blood sugar 2 times daily   It would be best to change up the times of day that you are testing your sugar.   Always test before breakfast (fasting) and then alternate testing blood sugar 2 hours after your meals.     Blood sugar targets:  Before breakfast:   (preferably < 110)  2 hours After meals: less than 180 (preferably less than 150)   Call for persistent blood sugars < 75 or > 200.   Blood sugars  greater than 200 are not acceptable to reach your goal of improving diabetes      Health Maintenance:   1. LABS: It is important to monitor your kidney function (blood and urine protein levels) , liver function tests and cholesterol levels when you have diabetes.     2. FOOT EXAMS:  daily foot inspections for foot wounds or skin changes are important for foot care. Any unusual changes should be reported immediately.    3. EYE EXAMS: Checking your eyes for diabetes changes is important and you should have a dilated eye exam done by an eye doctor EVERY year since these changes occur in the BACK of the eye and not visible by you.  Please let me know if you need provider list for eye doctor.     Lifestyle Therapy:    1. NUTRITION: Maintain optimal weight, calorie restriction if overweight, plant-based diet    2. PHYSICAL ACTIVITY: 150 minutes per week (30 minutes a day 5 days a week) of moderate exertion such as walking, stair climbing.  Include strength training 2-3 times per week.  Increase as tolerated.    3. SLEEP: Try and get 7-8 hours of sleep per night    4. BEHAVIOR:  Tobacco cessation and alcohol in moderation      Reminders:  Meet with lisette Baldwin as scheduled    Mariya WEEKS, BC-[facility], [facility]  [address], [place] [address]  [address], [address] [place] [address]  [address] [place] [address]  Diabetes Services at [facility]  T [phone]  F [phone]

## 2025-01-15 ENCOUNTER — DIABETIC EDUCATION (OUTPATIENT)
Facility: CLINIC | Age: 70
End: 2025-01-15
Payer: COMMERCIAL

## 2025-01-15 DIAGNOSIS — E11.65 TYPE 2 DIABETES MELLITUS WITH HYPERGLYCEMIA, WITHOUT LONG-TERM CURRENT USE OF INSULIN (HCC): Primary | ICD-10-CM

## 2025-01-15 PROCEDURE — 97802 MEDICAL NUTRITION INDIV IN: CPT | Performed by: DIETITIAN, REGISTERED

## 2025-01-15 NOTE — PROGRESS NOTES
Medical Nutrition Therapy Assessment    Jatin Mcgraw 7/31/1955 was seen for individual Diabetic Medical Nutrition Therapy- an initial consult:    Date: 1/15/2025   Referral: Mariya Cook     Start time 10:00am  End time: 11:00am    Assessment:     Reason for visit: new diabetes diagnosis education       Met with a dietitian before?: no        Anthropometrics:  Wt Readings from Last 6 Encounters:   01/09/25 294 lb   12/26/24 297 lb 3.2 oz   11/06/24 (!) 335 lb   05/17/23 (!) 306 lb 3.2 oz   12/20/21 280 lb   10/25/21 276 lb 9.6 oz         Current diabetes medications:  Oral medication:  metformin er 500mg- not taking  Injectables: ozempic 0.25  Insulin:  Injection sites: Abdomin      Lipodystrophy: No      Taking correctly: yes    Labs:  A1c  Lab Results   Component Value Date    A1C 15.5 (H) 12/26/2024    A1C 6.2 (H) 05/17/2023    A1C 5.7 (H) 12/14/2021       Lipids  Lab Results   Component Value Date    CHOLEST 591 (H) 12/26/2024    CHOLEST 175 05/17/2023    LDL  12/26/2024      Comment:      Unable to calculate LDL due to Triglycerides >800 mg/dL        Optimal            <100 mg/dL   Near/Above OptimaL 100-129 mg/dL   Borderline High    130-159 mg/dL   High               160-189 mg/dL    Very High          >190 mg/dL         LDL 98 05/17/2023    HDL 26 (L) 12/26/2024    HDL 44 05/17/2023    NONHDLC 565 (H) 12/26/2024    NONHDLC 131 (H) 05/17/2023    TRIG 1,514 (H) 12/26/2024    TRIG 190 (H) 05/17/2023       Kidney  Lab Results   Component Value Date    BUN 10 12/26/2024    BUN 12 05/17/2023    CREATSERUM 0.91 12/26/2024    CREATSERUM 0.79 05/17/2023    GFRNAA 95 12/14/2021    GFRNAA 96 04/26/2021    GFRAA 110 12/14/2021    GFRAA 111 04/26/2021    MICROALBCREA 571.4 (H) 12/26/2024    MICROALBCREA 191.2 (H) 05/17/2023       Liver  Lab Results   Component Value Date    AST 17 12/26/2024    AST 13 (L) 05/17/2023    ALT 23 12/26/2024    ALT 22 05/17/2023         BP   BP Readings from Last 1 Encounters:   01/09/25  136/72           Glucose testing at home:           Full report downloaded and scanned into chart          Diet History:    Social:lives with wife, from [place]  Occupation:business sales       TYPICAL  FOOD  NOTES   Breakfast  egg x2 and bread and avocado and cucumber   Coffee and oj (inch)    no alcohol, candy, cake   Lunch  sometimes skips, whole grain bread sandwich, leftovers, soup       loves bread- getting whole bread   Dinner  chicken, potoato and veggies       wife intermittent fasting   Snacks  fruit loves fruit          Beverages  coffee tea no sugar, carbonated water, water         Eating out  used to not since christmas         Weight loss 337 September (diagnosis)  293 with silvina (last visit)        Physical Activity: treadmill daily- every other day, walk during lunch break       Sleep Patterns: good  Stress Management: job is demanding; physically less stressed not irritable; plays music      Nutrition Diagnosis:     PES: Food and nutrition related knowledge deficit related to living with diabetes as evidenced by elevated HBA1c      Intervention     -Comprehensive Nutrition Education Provided:  Reviewed carbohydrate counting and label reading.  Recommended carbohydrate targets of 45-60 grams at meals and 15 grams at snacks.  Provided suggestions for carbohydrate controlled snacks.  Discussed behavior modification to achieve and maintain weight loss of 5% or more.  Reviewed menu planning, food shopping, cooking tips, and food prep to eat more often at home.  Reviewed the MyPlate method with focus on balanced macronutrient consumption; identifying foods that are carbohydrates, lean protein, non-starchy vegetables, and heart healthy fats.      -Education on Increased Physical Activity:  discussed how increased physical activity improves insulin resistance, blood glucose control, and heart health      Monitoring and Evaluation     Diet modification/understanding  Blood sugars  Hemoglobin A1c  Weight  trends  Physical activity      Recommendations  Practice healthful eating patterns, emphasizing a variety of nutrient dense foods in appropriate portion sizes.    Monitor carbohydrate intake with the MyPlate method   Practice carbohydrate counting and label reading  Increase or continue physical activity of at least 150 mins, over at least 3 days a week or per doctors recommendations.   Achieve and maintain weight loss goals.   Improve glycemic control working towards an A1c of 7.0% or less  Testing blood glucose with meter/CGM working towards a time in range of 70% or higher.    Patient Specific Goals:  Great job with all the changes you have made  When you have a carb have a protein with it- including fruit  Keep it up with the exercise    Handouts Provided:  Carb counting        Blood Glucose Goals  Less than 130 mg/dl in the morning  Less than 180 mg/dl 2 hours after meals.  (You and your doctor might have discussed more specific goals)     Treating low blood sugars and high blood sugars  Lows: Use the rule of 15. If feeling shaky, sweaty, weak or dizzie, check blood sugars. If below 70,eat or drink 15 grams of fast acting carbohydrates. This includes juice, hard candy like starburst skittles or smarties, honey, rasins or glucose tabs. Wait 15 minutes and check blood sugars again. If low treat again. If over 70 and going to eat within an hour, do nothing. If over 70 and it will be over an hour until you eat again eat something with a protein.  Highs: Less than 250, drink water and move your body. If over 250 for more than 3 hours or experiencing  nausea, vomiting, stomach pain, confusion or stomach pain call our office at [phone] or go to the ER.       Follow up:   Future Appointments   Date Time Provider Department Center   1/31/2025  8:15 AM Mariya Cook APRN [facility]   2/5/2025  8:30 AM Nicolasa Nelson RD [facility]   2/27/2025  8:30 AM Jayden Virgen,  [facility] SUB GI    3/10/2025  9:45 AM Joe Lima MD EMG 8 [facility] Tc       Pt has no further questions at this time.     Nicolasa Nelson, RD, , LD, [facility]  Certified Diabetes Care and   Registered Dietitian

## 2025-01-17 ENCOUNTER — TELEPHONE (OUTPATIENT)
Age: 70
End: 2025-01-17

## 2025-01-17 ENCOUNTER — TELEPHONE (OUTPATIENT)
Dept: INTERNAL MEDICINE CLINIC | Facility: CLINIC | Age: 70
End: 2025-01-17

## 2025-01-17 NOTE — TELEPHONE ENCOUNTER
Incoming fax from [facility]     Patients Diabetic eye exam done on 1/14/2025 by      Placed in TO in basket for review

## 2025-01-17 NOTE — TELEPHONE ENCOUNTER
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 Abstracted into [facility], will bring to [facility] Monday for DH to sign before sent to scan

## 2025-01-23 ENCOUNTER — TELEPHONE (OUTPATIENT)
Facility: CLINIC | Age: 70
End: 2025-01-23

## 2025-01-23 DIAGNOSIS — E11.65 TYPE 2 DIABETES MELLITUS WITH HYPERGLYCEMIA, WITHOUT LONG-TERM CURRENT USE OF INSULIN (HCC): Primary | ICD-10-CM

## 2025-01-23 NOTE — PATIENT INSTRUCTIONS
It was great meeting with you today! I am so grateful we were able to meet and talk about your diabetes!  Great job with all the changes you have made  When you have a carb have a protein with it- including fruit  Keep it up with the exercise    Blood Glucose Goals  Between  mg/dl in the morning  Less than 180 mg/dl 2 hours after meals.  (You and your doctor might have discussed more specific goals)    Treating low blood sugars and high blood sugars  Always check your blood sugar if you feel any symptoms of a low blood sugar.      If you have a low blood sugar (less than 70 mg/dL) follow the \"Rule of 15\" to treat it:  1.) Take 15 grams of a quick acting carbohydrate (1 tablespoon of honey, 3-4 glucose tablets, 1/2 cup fruit juice, 1/2 can regular soda, or to-go pouch of applesauce). Only eat ONE 15g SERVING of a quick acting carbohydrate.  2.) Then check your blood sugar again after 15 minutes of drinking or eating the quick acting carbohydrate to be sure your blood sugar is above 70 mg/dL.   3.) If your blood sugar is still less than 70 mg/dL, repeat treatment of 15 grams of a quick acting carbohydrate (1 tablespoon of honey, 3-4 glucose tablets, 1/2 cup fruit juice, 1/2 can regular soda, or to-go pouch  of applesauce).  4.) If your next meal is more than one hour away, eat a small snack. Try to have some protein and complex carbohydrate (peanut butter crackers, pretzels and cheese, etc).   5.) If you are not sure what caused your low blood sugar, call your healthcare provider.  6.) Always check your blood sugar before you drive.     _______________________________________________       To treat a low, we recommend you carry with you easy, pre-portioned treatment for low blood sugars that are 15G of carbs:   - Children sized squeeze pouch applesauce (high fiber + carbs help prevent too high of a spike)  - Small children's sized juicebox- 15g carb --> 4oz juice box  - Glucose tablets from [employer]/[facility], you  can find them near diabetes supplies --> Note, you will need to eat 3-4 tablets to get to 15g of carbs  - Children sized fruit snack pack- look for one with 15 grams of total carbohydrate     AVOID complex carbs, or foods that contain fats along with carbs (like chocolate) can slow the absorption of glucose and should NOT be used to treat an emergency low.    You are doing amazing! Keep up the great work!    Nicolasa Nelson, RD, , LD, CDCES(she/her/hers)  Diabetes Educator  [facility]  [email or web address]  [phone] phone  [phone] Fax

## 2025-01-27 ENCOUNTER — TELEPHONE (OUTPATIENT)
Dept: ENDOCRINOLOGY CLINIC | Facility: CLINIC | Age: 70
End: 2025-01-27

## 2025-01-30 ENCOUNTER — TELEPHONE (OUTPATIENT)
Dept: ENDOCRINOLOGY CLINIC | Facility: CLINIC | Age: 70
End: 2025-01-30

## 2025-01-30 DIAGNOSIS — E11.65 TYPE 2 DIABETES MELLITUS WITH HYPERGLYCEMIA, WITHOUT LONG-TERM CURRENT USE OF INSULIN (HCC): Primary | ICD-10-CM

## 2025-01-30 RX ORDER — SEMAGLUTIDE 0.68 MG/ML
0.5 INJECTION, SOLUTION SUBCUTANEOUS WEEKLY
Qty: 3 ML | Refills: 0 | Status: SHIPPED | OUTPATIENT
Start: 2025-01-30

## 2025-01-30 NOTE — TELEPHONE ENCOUNTER
Contacted patient for condition update.  Patient tolerating Ozempic 0.25mg injection weekly (4th dose will be taken upcoming Sunday) without difficulty.  Advised patient to take 4th dose of Ozempic 0.25mg injection weekly on Sunday as scheduled and then increase Ozempic to 0.5mg injection weekly.  Patient scheduled for virtual visit in 5 weeks and will have A1c done at lab prior to visit.      Personal Freestyle Irwin CGM  Analysis of data: 1/2/2025 - 1/29/2025  % Time CGM is Active: 99%  Sensor download: full report  in media  Average glucose : 128 mg/dl   GMI: 6.4%    CV (coefficient of variation) : 27.4%     9% time above 180mg /dl   2% time above 250 mg/dl  89% time in target range:  mg/dl   0% time below target range: 70mg/dl     Evaluation   1. Baseline glucose stable and in target range  2. Minimal postprandial elevation  3. Low likelihood of hypoglycemia  4. Normal glucose variability      Mariya Cook APRN, BC-ADM, CDCES

## 2025-02-05 ENCOUNTER — DIABETIC EDUCATION (OUTPATIENT)
Facility: CLINIC | Age: 70
End: 2025-02-05
Payer: COMMERCIAL

## 2025-02-05 DIAGNOSIS — E11.65 TYPE 2 DIABETES MELLITUS WITH HYPERGLYCEMIA, WITHOUT LONG-TERM CURRENT USE OF INSULIN (HCC): Primary | ICD-10-CM

## 2025-02-05 NOTE — PROGRESS NOTES
Medical Nutrition Therapy Assessment    Jatin Mcgraw 7/31/1955 was seen for individual Diabetic Medical Nutrition Therapy- a follow up visit:    Date: 2/5/2025   Referral: Mariya Cook     Start time 8:30am  End time: 9:00am    Assessment:     Reason for visit: follow up on new diabetes diagnosis education       Met with a dietitian before?: no        Anthropometrics:  Wt Readings from Last 6 Encounters:   01/09/25 294 lb   12/26/24 297 lb 3.2 oz   11/06/24 (!) 335 lb   05/17/23 (!) 306 lb 3.2 oz   12/20/21 280 lb   10/25/21 276 lb 9.6 oz         Current diabetes medications:    Injectables: ozempic 0.25    Injection sites: Abdomin      Lipodystrophy: No      Taking correctly: yes    Labs:  A1c  Lab Results   Component Value Date    A1C 15.5 (H) 12/26/2024    A1C 6.2 (H) 05/17/2023    A1C 5.7 (H) 12/14/2021       Lipids  Lab Results   Component Value Date    CHOLEST 591 (H) 12/26/2024    CHOLEST 175 05/17/2023    LDL  12/26/2024      Comment:      Unable to calculate LDL due to Triglycerides >800 mg/dL        Optimal            <100 mg/dL   Near/Above OptimaL 100-129 mg/dL   Borderline High    130-159 mg/dL   High               160-189 mg/dL    Very High          >190 mg/dL         LDL 98 05/17/2023    HDL 26 (L) 12/26/2024    HDL 44 05/17/2023    NONHDLC 565 (H) 12/26/2024    NONHDLC 131 (H) 05/17/2023    TRIG 1,514 (H) 12/26/2024    TRIG 190 (H) 05/17/2023       Kidney  Lab Results   Component Value Date    BUN 10 12/26/2024    BUN 12 05/17/2023    CREATSERUM 0.91 12/26/2024    CREATSERUM 0.79 05/17/2023    GFRNAA 95 12/14/2021    GFRNAA 96 04/26/2021    GFRAA 110 12/14/2021    GFRAA 111 04/26/2021    MICROALBCREA 571.4 (H) 12/26/2024    MICROALBCREA 191.2 (H) 05/17/2023       Liver  Lab Results   Component Value Date    AST 17 12/26/2024    AST 13 (L) 05/17/2023    ALT 23 12/26/2024    ALT 22 05/17/2023         BP   BP Readings from Last 1 Encounters:   01/09/25 136/72           Glucose testing at home:                  Full report downloaded and scanned into chart          Diet History:    Social:lives with wife, from [place]  Occupation:business sales       TYPICAL  FOOD  NOTES   Breakfast  egg x2 and bread and avocado and cucumber   Coffee and oj (inch)    no alcohol, candy, cake   Lunch  sometimes skips, whole grain bread sandwich, leftovers, soup       loves bread- getting whole bread   Dinner  chicken, potoato and veggies       wife intermittent fasting   Snacks  fruit loves fruit          Beverages  coffee tea no sugar, carbonated water, water         Eating out  used to not since raheem         Weight loss 337 September (diagnosis)  293 with silvina (last visit)        Physical Activity: treadmill daily- every other day, walk during lunch break       Sleep Patterns: good  Stress Management: job is demanding; physically less stressed not irritable; plays music      Nutrition Diagnosis:     PES: Food and nutrition related knowledge deficit related to living with diabetes as evidenced by elevated HBA1c      Intervention     -Comprehensive Nutrition Education Provided:  Reviewed carbohydrate counting and label reading.  Recommended carbohydrate targets of 45-60 grams at meals and 15 grams at snacks.  Provided suggestions for carbohydrate controlled snacks.  Discussed behavior modification to achieve and maintain weight loss of 5% or more.  Reviewed menu planning, food shopping, cooking tips, and food prep to eat more often at home.  Reviewed the MyPlate method with focus on balanced macronutrient consumption; identifying foods that are carbohydrates, lean protein, non-starchy vegetables, and heart healthy fats.      -Education on Increased Physical Activity:  discussed how increased physical activity improves insulin resistance, blood glucose control, and heart health      Monitoring and Evaluation     Diet modification/understanding  Blood sugars  Hemoglobin A1c  Weight trends  Physical  activity      Recommendations  Practice healthful eating patterns, emphasizing a variety of nutrient dense foods in appropriate portion sizes.    Monitor carbohydrate intake with the MyPlate method   Practice carbohydrate counting and label reading  Increase or continue physical activity of at least 150 mins, over at least 3 days a week or per doctors recommendations.   Achieve and maintain weight loss goals.   Improve glycemic control working towards an A1c of 7.0% or less  Testing blood glucose with meter/CGM working towards a time in range of 70% or higher.    Patient Specific Goals:  Great job with all the changes you have made      Handouts Provided:  Carb counting        Blood Glucose Goals  Less than 130 mg/dl in the morning  Less than 180 mg/dl 2 hours after meals.  (You and your doctor might have discussed more specific goals)     Treating low blood sugars and high blood sugars  Lows: Use the rule of 15. If feeling shaky, sweaty, weak or dizzie, check blood sugars. If below 70,eat or drink 15 grams of fast acting carbohydrates. This includes juice, hard candy like starburst skittles or smarties, honey, rasins or glucose tabs. Wait 15 minutes and check blood sugars again. If low treat again. If over 70 and going to eat within an hour, do nothing. If over 70 and it will be over an hour until you eat again eat something with a protein.  Highs: Less than 250, drink water and move your body. If over 250 for more than 3 hours or experiencing  nausea, vomiting, stomach pain, confusion or stomach pain call our office at [phone] or go to the ER.       Follow up:   Future Appointments   Date Time Provider Department Center   2/27/2025  8:30 AM Jayden Virgen DO [facility] SUB GI   2/28/2025 10:00 AM Mariya Cook APRN [facility]   3/5/2025  3:30 PM Joe Lima MD [facility] [place]   3/10/2025  9:45 AM Joe Lima MD [facility] [place]         Pt has no further questions at this time.      Nicolasa Nelson RD, , LD, [facility]  Certified Diabetes Care and   Registered Dietitian

## 2025-02-21 NOTE — PATIENT INSTRUCTIONS
It was great meeting with you today! I am so grateful we were able to meet and talk about your diabetes!  Great job with all of the changes you have made    Blood Glucose Goals  Between  mg/dl in the morning  Less than 180 mg/dl 2 hours after meals.  (You and your doctor might have discussed more specific goals)    Treating low blood sugars and high blood sugars  Always check your blood sugar if you feel any symptoms of a low blood sugar.      If you have a low blood sugar (less than 70 mg/dL) follow the \"Rule of 15\" to treat it:  1.) Take 15 grams of a quick acting carbohydrate (1 tablespoon of honey, 3-4 glucose tablets, 1/2 cup fruit juice, 1/2 can regular soda, or to-go pouch of applesauce). Only eat ONE 15g SERVING of a quick acting carbohydrate.  2.) Then check your blood sugar again after 15 minutes of drinking or eating the quick acting carbohydrate to be sure your blood sugar is above 70 mg/dL.   3.) If your blood sugar is still less than 70 mg/dL, repeat treatment of 15 grams of a quick acting carbohydrate (1 tablespoon of honey, 3-4 glucose tablets, 1/2 cup fruit juice, 1/2 can regular soda, or to-go pouch  of applesauce).  4.) If your next meal is more than one hour away, eat a small snack. Try to have some protein and complex carbohydrate (peanut butter crackers, pretzels and cheese, etc).   5.) If you are not sure what caused your low blood sugar, call your healthcare provider.  6.) Always check your blood sugar before you drive.     _______________________________________________       To treat a low, we recommend you carry with you easy, pre-portioned treatment for low blood sugars that are 15G of carbs:   - Children sized squeeze pouch applesauce (high fiber + carbs help prevent too high of a spike)  - Small children's sized juicebox- 15g carb --> 4oz juice box  - Glucose tablets from [email or web address]/[facility], you can find them near diabetes supplies --> Note, you will need to eat 3-4 tablets to get to  15g of carbs  - Children sized fruit snack pack- look for one with 15 grams of total carbohydrate     AVOID complex carbs, or foods that contain fats along with carbs (like chocolate) can slow the absorption of glucose and should NOT be used to treat an emergency low.    You are doing amazing! Keep up the great work!    Nicolasa Nelson, RD, , LD, CDCES(she/her/hers)  Diabetes Educator  [facility]  [email or web address]  [phone] phone  [phone] Fax

## 2025-02-24 ENCOUNTER — TELEPHONE (OUTPATIENT)
Dept: ENDOCRINOLOGY CLINIC | Facility: CLINIC | Age: 70
End: 2025-02-24

## 2025-02-24 ENCOUNTER — LAB ENCOUNTER (OUTPATIENT)
Dept: LAB | Age: 70
End: 2025-02-24
Attending: NURSE PRACTITIONER
Payer: COMMERCIAL

## 2025-02-24 DIAGNOSIS — E11.65 TYPE 2 DIABETES MELLITUS WITH HYPERGLYCEMIA, WITHOUT LONG-TERM CURRENT USE OF INSULIN (HCC): ICD-10-CM

## 2025-02-24 LAB
EST. AVERAGE GLUCOSE BLD GHB EST-MCNC: 203 MG/DL (ref 68–126)
HBA1C MFR BLD: 8.7 % (ref ?–5.7)

## 2025-02-24 PROCEDURE — 83036 HEMOGLOBIN GLYCOSYLATED A1C: CPT

## 2025-02-24 PROCEDURE — 36415 COLL VENOUS BLD VENIPUNCTURE: CPT

## 2025-02-27 PROBLEM — Z12.11 SPECIAL SCREENING FOR MALIGNANT NEOPLASM OF COLON: Status: ACTIVE | Noted: 2025-02-27

## 2025-02-28 ENCOUNTER — TELEMEDICINE (OUTPATIENT)
Dept: ENDOCRINOLOGY CLINIC | Facility: CLINIC | Age: 70
End: 2025-02-28
Payer: COMMERCIAL

## 2025-02-28 DIAGNOSIS — E78.1 PURE HYPERTRIGLYCERIDEMIA: ICD-10-CM

## 2025-02-28 DIAGNOSIS — I10 PRIMARY HYPERTENSION: ICD-10-CM

## 2025-02-28 DIAGNOSIS — E11.65 TYPE 2 DIABETES MELLITUS WITH HYPERGLYCEMIA, WITHOUT LONG-TERM CURRENT USE OF INSULIN (HCC): Primary | ICD-10-CM

## 2025-02-28 PROCEDURE — 95251 CONT GLUC MNTR ANALYSIS I&R: CPT | Performed by: NURSE PRACTITIONER

## 2025-02-28 PROCEDURE — 98007 SYNCH AUDIO-VIDEO EST HI 40: CPT | Performed by: NURSE PRACTITIONER

## 2025-02-28 NOTE — PROGRESS NOTES
HPI:   Jatin Mcgraw is a 69 year old male who presents virtually for the management of his diabetes.   This visit is conducted using Telemedicine with live, two way, interactive video and audio.  Patient verbally consents to Telemedicine visit.  Patient understands and accepts financial responsibility for any deductible, co-insurance and/or co-pays associated with this service.    Chief Complaint: Diabetes Follow Up    Diabetes: improved, stable, needs further improvement  Type: 2   Duration:dx 2020  Current Meds: Ozempic 0.5mg injection weekly   SE: denies  Long term insulin use: n/a  Failed Meds: Metformin     Complications: PVD, PIERO    Personal Resource Interactivestyle Irwin 3 CGM  Analysis of data: 1/31/2025 - 2/27/2025  % Time CGM is Active: 78%  Sensor download: full report  in media  Average glucose : 113 mg/dl   GMI: 6.0%    CV (coefficient of variation) : 16.2%     1% time above 180mg /dl   0% time above 250 mg/dl  99% time in target range:  mg/dl   0% time below target range: 70mg/dl     Evaluation   1. Baseline glucose stable and in target range  2. Minimal postprandial elevations  3. Low likelihood of hypoglycemia  4. Minimal glucose variability         Overall glucose control:   HGBA1C:    Lab Results   Component Value Date    A1C 8.7 (H) 02/24/2025    A1C 15.5 (H) 12/26/2024    A1C 6.2 (H) 05/17/2023     (H) 02/24/2025          Wt Readings from Last 3 Encounters:   02/26/25 290 lb (131.5 kg)   01/09/25 294 lb (133.4 kg)   12/26/24 297 lb 3.2 oz (134.8 kg)           Past History:   He  has a past medical history of Diabetes (HCC) (2017?), High cholesterol, Obesity, PIERO (obstructive sleep apnea) (6/9/2020 [facility] HST), and Wears glasses.   His family history includes Diabetes in his mother; Heart Attack in his maternal grandfather; Other in his mother.   He  reports that he has never smoked. He has never been exposed to tobacco smoke. He has never used smokeless tobacco. He reports that he does not  currently use alcohol after a past usage of about 3.0 standard drinks of alcohol per week. He reports that he does not use drugs.     He is allergic to penicillins and lisinopril.     Current Outpatient Medications on File Prior to Visit   Medication Sig    PEG 3350-KCl-Na Bicarb-NaCl 420 g Oral Recon Soln Take as directed by physician.    semaglutide (OZEMPIC, 0.25 OR 0.5 MG/DOSE,) 2 MG/3ML Subcutaneous Solution Pen-injector Inject 0.5 mg into the skin once a week.    Continuous Glucose Sensor (FREESTYLE GLENNY 3 PLUS SENSOR) Does not apply Misc 1 each every 15 (fifteen) days.    rosuvastatin (CRESTOR) 10 MG Oral Tab Take 1 tablet (10 mg total) by mouth nightly.    valsartan 80 MG Oral Tab Take 1 tablet (80 mg total) by mouth daily.     No current facility-administered medications on file prior to visit.       CMP  (most recent labs)   Lab Results   Component Value Date/Time     (H) 12/26/2024 01:53 PM    BUN 10 12/26/2024 01:53 PM    CREATSERUM 0.91 12/26/2024 01:53 PM    GFRNAA 95 12/14/2021 08:47 AM    GFRAA 110 12/14/2021 08:47 AM    EGFRCR 92 12/26/2024 01:53 PM    CA 10.0 12/26/2024 01:53 PM    ALKPHO 95 12/26/2024 01:53 PM    AST 17 12/26/2024 01:53 PM    ALT 23 12/26/2024 01:53 PM    BILT 0.5 12/26/2024 01:53 PM    TP 7.7 12/26/2024 01:53 PM    ALB 4.4 12/26/2024 01:53 PM     (L) 12/26/2024 01:53 PM    K 4.9 12/26/2024 01:53 PM    CL 96 (L) 12/26/2024 01:53 PM    CO2 21.0 12/26/2024 01:53 PM           Lipids  (most recent labs)   Lab Results   Component Value Date/Time    CHOLEST 591 (H) 12/26/2024 01:53 PM    TRIG 1,514 (H) 12/26/2024 01:53 PM    HDL 26 (L) 12/26/2024 01:53 PM    LDL  12/26/2024 01:53 PM      Comment:      Unable to calculate LDL due to Triglycerides >800 mg/dL        Optimal            <100 mg/dL   Near/Above OptimaL 100-129 mg/dL   Borderline High    130-159 mg/dL   High               160-189 mg/dL    Very High          >190 mg/dL         [facility] 565 (H) 12/26/2024 01:53 PM           Diabetes  (most recent labs)   Lab Results   Component Value Date/Time    A1C 8.7 (H) 02/24/2025 08:56 AM          Microalb (most recent labs)   Lab Results   Component Value Date/Time    MICROALBCREA 571.4 (H) 12/26/2024 01:53 PM        Lab results reviewed with patient.    REVIEW OF SYSTEMS:   GENERAL HEALTH: feels well otherwise  SKIN: denies any unusual skin lesions or rashes  RESPIRATORY: denies shortness of breath with exertion  CARDIOVASCULAR: denies chest pain on exertion  GI: denies abdominal pain and denies heartburn  NEURO: denies headaches     EXAM:   Physical Exam  Constitutional:       Appearance: Normal appearance.   Pulmonary:      Comments: Able to speak in complete sentences without difficulty  Neurological:      Mental Status: He is alert and oriented to person, place, and time.   Psychiatric:         Mood and Affect: Mood normal.         Behavior: Behavior normal.         ASSESSMENT AND PLAN:   Diabetes:  Patient to continue Ozempic 0.5mg injection weekly.  Patient will message provider in one month if he would like to increase dose of Ozempic to 1mg injection weekly.  Per ADA guidelines, in patients with type 2 diabetes and established ASCVD, incorporating agent proven to reduce major adverse CV events and CV mortality   GLP-1 Ozempic rx chosen since it not only lowers A1C, but studies have shown it can also reduce the risk of major CV events such as heart attack, stroke, or death in adults with type 2 diabetes who are currently treating their CV disease.  Several trials have demonstrated that GLP-1 agonist medications can reduce aminotransferase levels and improve liver histology in patients with NAFLD, suggesting that these agents could serve as an alternative treatment option for these patients    Studies have shown that GLP-1 receptor agonist may be associated with decreased risk of acute pancreatitis in individuals with high triglycerides, potentially offering a preventative  benefit for those susceptible to hypertriglyceridemia related pancreatitis.    Patient to continue to use personal Freestyle Irwin CGM for glucose monitoring.  Discussed self monitoring blood glucose and the importance of monitoring.  Patient agreed to monitoring bid - fasting and 2 hours postprandial of one meal per day if not using CGM.      Reinforced healthy eating in healthy portions and increasing daily physical activity.    Reviewed ways to improve the protein in the urine:   Keep blood sugars in target range   Keep blood pressure in target range   Watch over the counter medicines like NSAID (non steroidal anti-inflammatory drugs) these can be harmful to  kidneys (examples include: , Motrin , Advil, ibuprofen, naprosyn, Aleve)   Continue ARB therapy - renoprotective      Hypertension:  Patient to continue ARB therapy.    Hyperlipidemia:  Patient to continue statin therapy.    DM Health Maintenance  Last dilated eye exam: Last Dilated Eye Exam: 25   Exam shows retinopathy? Eye Exam shows Diabetic Retinopathy?: Yes    Last diabetic foot exam: Last Foot Exam: 25    Date of last PHQ-2 depression screen: PHQ-2 - Date of last depression screenin2025    Patient  reports that he has never smoked. He has never been exposed to tobacco smoke. He has never used smokeless tobacco.  When is flu vaccine due? No recommendations at this time  When is pneumonia vaccine due? No recommendations at this time    The patient indicates understanding of these issues and agrees to the plan.  Refills addressed at time of office visit.    Diagnoses and all orders for this visit:    Type 2 diabetes mellitus with hyperglycemia, without long-term current use of insulin (HCC)  -     Comp Metabolic Panel (14) [E]; Future  -     Hemoglobin A1C [E]; Future  -     Microalb/Creat Ratio, Random Urine [E]; Future    Primary hypertension    Pure hypertriglyceridemia  -     Lipid Panel [E]; Future       Return in about 3 months  (around 5/28/2025) for 45 minute appointment, Personal CGM.    The risks and benefits of my recommendations, as well as other treatment options were discussed with the patient today. questions were answered to the best of my knowledge.  Patient verbalizes understanding and amendable to plan of care.  Duration of this service:  40 minutes   Mariya WEEKS, BC-ADM, [facility]

## 2025-03-12 ENCOUNTER — OFFICE VISIT (OUTPATIENT)
Dept: INTERNAL MEDICINE CLINIC | Facility: CLINIC | Age: 70
End: 2025-03-12
Payer: COMMERCIAL

## 2025-03-12 VITALS
SYSTOLIC BLOOD PRESSURE: 130 MMHG | OXYGEN SATURATION: 98 % | HEIGHT: 71.34 IN | TEMPERATURE: 98 F | BODY MASS INDEX: 40.54 KG/M2 | HEART RATE: 75 BPM | WEIGHT: 292.81 LBS | DIASTOLIC BLOOD PRESSURE: 78 MMHG

## 2025-03-12 DIAGNOSIS — E78.2 MIXED HYPERLIPIDEMIA: ICD-10-CM

## 2025-03-12 DIAGNOSIS — E11.65 TYPE 2 DIABETES MELLITUS WITH HYPERGLYCEMIA, WITHOUT LONG-TERM CURRENT USE OF INSULIN (HCC): ICD-10-CM

## 2025-03-12 DIAGNOSIS — I10 PRIMARY HYPERTENSION: Primary | ICD-10-CM

## 2025-03-12 PROCEDURE — 3078F DIAST BP <80 MM HG: CPT | Performed by: FAMILY MEDICINE

## 2025-03-12 PROCEDURE — 3075F SYST BP GE 130 - 139MM HG: CPT | Performed by: FAMILY MEDICINE

## 2025-03-12 PROCEDURE — 99214 OFFICE O/P EST MOD 30 MIN: CPT | Performed by: FAMILY MEDICINE

## 2025-03-12 PROCEDURE — 3052F HG A1C>EQUAL 8.0%<EQUAL 9.0%: CPT | Performed by: FAMILY MEDICINE

## 2025-03-12 PROCEDURE — 3008F BODY MASS INDEX DOCD: CPT | Performed by: FAMILY MEDICINE

## 2025-03-12 RX ORDER — SILDENAFIL 100 MG/1
100 TABLET, FILM COATED ORAL
Qty: 30 TABLET | Refills: 2 | Status: SHIPPED | OUTPATIENT
Start: 2025-03-12

## 2025-03-12 RX ORDER — VALSARTAN 160 MG/1
160 TABLET ORAL DAILY
Qty: 90 TABLET | Refills: 0 | Status: SHIPPED | OUTPATIENT
Start: 2025-03-12

## 2025-03-12 NOTE — PROGRESS NOTES
Jatin Mcgraw is a 69 year old male.  HPI:   Here to f/u in regards to the BP and DM   Has been seeing Mariya for the DM    Last A1c 8.6     On ozempic now    Has changed his lifestyle  eating habits    exercises 5d a week    Does feel so much better than last time    The vision had changed but is correcting itself again   Breathing is good    no CP   Is on the valsartan and crestor         Current Outpatient Medications   Medication Sig Dispense Refill    valsartan 160 MG Oral Tab Take 1 tablet (160 mg total) by mouth daily. 90 tablet 0    Sildenafil Citrate 100 MG Oral Tab Take 1 tablet (100 mg total) by mouth daily as needed for Erectile Dysfunction. 30 tablet 2    semaglutide (OZEMPIC, 0.25 OR 0.5 MG/DOSE,) 2 MG/3ML Subcutaneous Solution Pen-injector Inject 0.5 mg into the skin once a week. 3 mL 0    Continuous Glucose Sensor (FREESTYLE GLENNY 3 PLUS SENSOR) Does not apply Misc 1 each every 15 (fifteen) days. 6 each 3    rosuvastatin (CRESTOR) 10 MG Oral Tab Take 1 tablet (10 mg total) by mouth nightly. 90 tablet 0    PEG 3350-KCl-Na Bicarb-NaCl 420 g Oral Recon Soln Take as directed by physician. (Patient not taking: Reported on 3/12/2025) 4000 mL 0      Past Medical History:    Diabetes (HCC)    High cholesterol    Obesity    PIERO (obstructive sleep apnea)    AHI 54 [place] 64%    Wears glasses      Social History:  Social History     Socioeconomic History    Marital status:    Tobacco Use    Smoking status: Never     Passive exposure: Never    Smokeless tobacco: Never   Vaping Use    Vaping status: Never Used   Substance and Sexual Activity    Alcohol use: Not Currently     Alcohol/week: 3.0 standard drinks of alcohol     Types: 2 Glasses of wine, 1 Cans of beer per week     Comment: 2 drinks / week    Drug use: Never   Other Topics Concern    Caffeine Concern No    Exercise Yes    Weight Concern Yes     Social Drivers of Health     Food Insecurity: No Food Insecurity (3/12/2025)    NCSS - Food  Insecurity     Worried About Running Out of Food in the Last Year: No     Ran Out of Food in the Last Year: No   Transportation Needs: No Transportation Needs (3/12/2025)    NCSS - Transportation     Lack of Transportation: No   Housing Stability: Not At Risk (3/12/2025)    NCSS - Housing/Utilities     Has Housing: Yes     Worried About Losing Housing: No     Unable to Get Utilities: No        REVIEW OF SYSTEMS:   GENERAL HEALTH: feels well otherwise  SKIN: denies rashes  RESPIRATORY: denies shortness of breath  CARDIOVASCULAR: denies chest pain   GI: denies abdominal pain  NEURO: denies headaches    EXAM:   /78 (BP Location: Left arm, Patient Position: Sitting, Cuff Size: large)   Pulse 75   Temp 97.8 °F (36.6 °C) (Temporal)   Ht 5' 11.34\" (1.812 m)   Wt 292 lb 12.8 oz (132.8 kg)   SpO2 98%   BMI 40.45 kg/m²   GENERAL: well developed, well nourished,in no apparent distress  SKIN: no rashes  NECK: supple,no adenopathy  LUNGS: clear to auscultation  CARDIO: RRR without murmur  EXTREMITIES: no edema    ASSESSMENT AND PLAN:   1. Primary hypertension  BP better but I would bump up the valsartan to 160 to get better control    CPM w weight loss and diet/exercise     f/u 6 mo     - Comp Metabolic Panel (14); Future    2. Mixed hyperlipidemia  On crestor now    glad he is motivated to bring #s down     check labs  - Lipid Panel; Future    3. Type 2 diabetes mellitus with hyperglycemia, without long-term current use of insulin (HCC)  Seeing Mariya    A1c better, feels better, vision improving to baseline         The patient indicates understanding of these issues and agrees to the plan.  .

## 2025-03-24 RX ORDER — VALSARTAN 80 MG/1
80 TABLET ORAL DAILY
Qty: 90 TABLET | Refills: 0 | OUTPATIENT
Start: 2025-03-24

## 2025-03-25 ENCOUNTER — PATIENT MESSAGE (OUTPATIENT)
Dept: ENDOCRINOLOGY CLINIC | Facility: CLINIC | Age: 70
End: 2025-03-25

## 2025-03-25 DIAGNOSIS — E11.65 TYPE 2 DIABETES MELLITUS WITH HYPERGLYCEMIA, WITHOUT LONG-TERM CURRENT USE OF INSULIN (HCC): Primary | ICD-10-CM

## 2025-03-25 RX ORDER — SEMAGLUTIDE 1.34 MG/ML
1 INJECTION, SOLUTION SUBCUTANEOUS WEEKLY
Qty: 3 ML | Refills: 1 | Status: SHIPPED | OUTPATIENT
Start: 2025-03-25

## 2025-03-25 NOTE — TELEPHONE ENCOUNTER
Patient sent My Chart Message  - would like to increase Ozempic to 1 mg. Next dose due this weekend. Per last visit: \"Patient to continue Ozempic 0.5mg injection weekly.  Patient will message provider in one month if he would like to increase dose of Ozempic to 1mg injection weekly.\" Pended Ozempic 1 mg to pharmacy.     Patient mentioned that smaller doses have not really seemed to have effect on appetite, glucose, or weight loss so far.     Last office visit and Provider: 2/28/25 - MICKY Armenta   Future Appointments   Date Time Provider Department Center   6/16/2025  8:15 AM Mariya Cook APRN [facility]   Last A1c value was 8.7% done 2/24/2025.

## 2025-03-30 RX ORDER — ROSUVASTATIN CALCIUM 10 MG/1
10 TABLET, COATED ORAL NIGHTLY
Qty: 90 TABLET | Refills: 0 | Status: SHIPPED | OUTPATIENT
Start: 2025-03-30

## 2025-06-09 ENCOUNTER — LAB ENCOUNTER (OUTPATIENT)
Dept: LAB | Age: 70
End: 2025-06-09
Attending: NURSE PRACTITIONER
Payer: COMMERCIAL

## 2025-06-09 DIAGNOSIS — E78.1 PURE HYPERTRIGLYCERIDEMIA: ICD-10-CM

## 2025-06-09 DIAGNOSIS — E11.65 TYPE 2 DIABETES MELLITUS WITH HYPERGLYCEMIA, WITHOUT LONG-TERM CURRENT USE OF INSULIN (HCC): ICD-10-CM

## 2025-06-09 DIAGNOSIS — I10 PRIMARY HYPERTENSION: ICD-10-CM

## 2025-06-09 DIAGNOSIS — E78.2 MIXED HYPERLIPIDEMIA: ICD-10-CM

## 2025-06-09 LAB
ALBUMIN SERPL-MCNC: 4.5 G/DL (ref 3.2–4.8)
ALBUMIN/GLOB SERPL: 1.7 {RATIO} (ref 1–2)
ALP LIVER SERPL-CCNC: 65 U/L (ref 45–117)
ALT SERPL-CCNC: 27 U/L (ref 10–49)
ANION GAP SERPL CALC-SCNC: 9 MMOL/L (ref 0–18)
AST SERPL-CCNC: 23 U/L (ref ?–34)
BILIRUB SERPL-MCNC: 0.3 MG/DL (ref 0.2–1.1)
BUN BLD-MCNC: 10 MG/DL (ref 9–23)
CALCIUM BLD-MCNC: 9.7 MG/DL (ref 8.7–10.6)
CHLORIDE SERPL-SCNC: 105 MMOL/L (ref 98–112)
CHOLEST SERPL-MCNC: 107 MG/DL (ref ?–200)
CO2 SERPL-SCNC: 25 MMOL/L (ref 21–32)
CREAT BLD-MCNC: 0.79 MG/DL (ref 0.7–1.3)
CREAT UR-SCNC: 159.6 MG/DL
EGFRCR SERPLBLD CKD-EPI 2021: 97 ML/MIN/1.73M2 (ref 60–?)
EST. AVERAGE GLUCOSE BLD GHB EST-MCNC: 126 MG/DL (ref 68–126)
FASTING PATIENT LIPID ANSWER: YES
FASTING STATUS PATIENT QL REPORTED: YES
GLOBULIN PLAS-MCNC: 2.6 G/DL (ref 2–3.5)
GLUCOSE BLD-MCNC: 93 MG/DL (ref 70–99)
HBA1C MFR BLD: 6 % (ref ?–5.7)
HDLC SERPL-MCNC: 36 MG/DL (ref 40–59)
LDLC SERPL CALC-MCNC: 47 MG/DL (ref ?–100)
MICROALBUMIN UR-MCNC: 18.3 MG/DL
MICROALBUMIN/CREAT 24H UR-RTO: 114.7 UG/MG (ref ?–30)
NONHDLC SERPL-MCNC: 71 MG/DL (ref ?–130)
OSMOLALITY SERPL CALC.SUM OF ELEC: 287 MOSM/KG (ref 275–295)
POTASSIUM SERPL-SCNC: 4.6 MMOL/L (ref 3.5–5.1)
PROT SERPL-MCNC: 7.1 G/DL (ref 5.7–8.2)
SODIUM SERPL-SCNC: 139 MMOL/L (ref 136–145)
TRIGL SERPL-MCNC: 140 MG/DL (ref 30–149)
VLDLC SERPL CALC-MCNC: 20 MG/DL (ref 0–30)

## 2025-06-09 PROCEDURE — 82570 ASSAY OF URINE CREATININE: CPT

## 2025-06-09 PROCEDURE — 82043 UR ALBUMIN QUANTITATIVE: CPT

## 2025-06-09 PROCEDURE — 36415 COLL VENOUS BLD VENIPUNCTURE: CPT

## 2025-06-09 PROCEDURE — 80061 LIPID PANEL: CPT

## 2025-06-09 PROCEDURE — 80053 COMPREHEN METABOLIC PANEL: CPT

## 2025-06-09 PROCEDURE — 83036 HEMOGLOBIN GLYCOSYLATED A1C: CPT

## 2025-06-09 RX ORDER — VALSARTAN 160 MG/1
160 TABLET ORAL DAILY
Qty: 90 TABLET | Refills: 0 | Status: SHIPPED | OUTPATIENT
Start: 2025-06-09

## 2025-06-09 NOTE — TELEPHONE ENCOUNTER
Requesting    Name from pharmacy: VALSARTAN 160MG TABLETS         Will file in chart as: VALSARTAN 160 MG Oral Tab    Sig: TAKE 1 TABLET(160 MG) BY MOUTH DAILY    Disp: 90 tablet    Refills: 0 (Pharmacy requested: Not specified)    Start: 6/7/2025    Class: Normal    Non-formulary    Last ordered: 2 months ago (3/12/2025) by Joe Lima MD    Last refill: 3/12/2025    Rx #: [id number]    Hypertension Medications Protocol Rgoeyc2006/07/2025 04:50 PM   Protocol Details CMP or BMP in past 12 months    Last BP reading less than 140/90    In person appointment or virtual visit in the past 12 mos or appointment in next 3 mos    EGFRCR or GFRNAA > 50    Medication is active on med list        LOV: 3/12/2025  RTC: 6 months   Last Relevant Labs: 12/26/2024  Filled: 3/12/2025 #90 with 0 refills    Future Appointments   Date Time Provider Department Center   6/16/2025  8:15 AM Mariya Cook APRN [facility]

## 2025-06-16 ENCOUNTER — OFFICE VISIT (OUTPATIENT)
Dept: ENDOCRINOLOGY CLINIC | Facility: CLINIC | Age: 70
End: 2025-06-16
Payer: COMMERCIAL

## 2025-06-16 VITALS
HEART RATE: 80 BPM | WEIGHT: 306 LBS | BODY MASS INDEX: 42 KG/M2 | DIASTOLIC BLOOD PRESSURE: 74 MMHG | SYSTOLIC BLOOD PRESSURE: 124 MMHG | RESPIRATION RATE: 16 BRPM

## 2025-06-16 DIAGNOSIS — E66.813 CLASS 3 DRUG-INDUCED OBESITY WITHOUT SERIOUS COMORBIDITY WITH BODY MASS INDEX (BMI) OF 40.0 TO 44.9 IN ADULT (HCC): ICD-10-CM

## 2025-06-16 DIAGNOSIS — E78.2 MIXED HYPERLIPIDEMIA: ICD-10-CM

## 2025-06-16 DIAGNOSIS — E11.65 TYPE 2 DIABETES MELLITUS WITH HYPERGLYCEMIA, WITHOUT LONG-TERM CURRENT USE OF INSULIN (HCC): Primary | ICD-10-CM

## 2025-06-16 DIAGNOSIS — E66.1 CLASS 3 DRUG-INDUCED OBESITY WITHOUT SERIOUS COMORBIDITY WITH BODY MASS INDEX (BMI) OF 40.0 TO 44.9 IN ADULT (HCC): ICD-10-CM

## 2025-06-16 DIAGNOSIS — I10 PRIMARY HYPERTENSION: ICD-10-CM

## 2025-06-16 LAB
GLUCOSE BLOOD: 107
TEST STRIP LOT #: NORMAL NUMERIC

## 2025-06-16 PROCEDURE — 82947 ASSAY GLUCOSE BLOOD QUANT: CPT | Performed by: NURSE PRACTITIONER

## 2025-06-16 PROCEDURE — 95251 CONT GLUC MNTR ANALYSIS I&R: CPT | Performed by: NURSE PRACTITIONER

## 2025-06-16 PROCEDURE — 3078F DIAST BP <80 MM HG: CPT | Performed by: NURSE PRACTITIONER

## 2025-06-16 PROCEDURE — 3074F SYST BP LT 130 MM HG: CPT | Performed by: NURSE PRACTITIONER

## 2025-06-16 PROCEDURE — 99214 OFFICE O/P EST MOD 30 MIN: CPT | Performed by: NURSE PRACTITIONER

## 2025-06-16 RX ORDER — SEMAGLUTIDE 2.68 MG/ML
2 INJECTION, SOLUTION SUBCUTANEOUS WEEKLY
Qty: 9 ML | Refills: 1 | Status: SHIPPED | OUTPATIENT
Start: 2025-06-16

## 2025-06-16 NOTE — PROGRESS NOTES
HPI:   Jatin Mcgraw is a 69 year old male who presents for initial visit with provider for the management of his diabetes.  Patient did not bring glucometer or glucose readings to appointment for review.  POC in office glucose 107 mg/dl.      Chief Complaint   Patient presents with    Diabetes     Follow up-irwin- was on vacation -        Diabetes: stable, at goal  Type: 2   Duration: 2020  Current Meds: Ozempic 1mg injection weekly  Long term insulin use: n/a  Failed Meds: Metformin   Complications: PVD, PIERO, Proteinuria, Retinopathy    Personal Freestyle Irwin 3+ CGM  Analysis of data: 2025 - 2025  % Time CGM is Active: 95%  Sensor download: full report  in media  Average glucose : 118 mg/dl   GMI: 6.1%    CV (coefficient of variation) : 19%     1% time above 180mg /dl   0% time above 250 mg/dl  99% time in target range:  mg/dl   0% time below target range: 70mg/dl     Evaluation   1. Baseline glucose stable and in target range  2. Minimal postprandial elevation  3. Low likelihood of hypoglycemia  4. Minimal glucose variability         Overall glucose control:   HGBA1C:    Lab Results   Component Value Date    A1C 6.0 (H) 2025    A1C 8.7 (H) 2025    A1C 15.5 (H) 2024     2025       Hypertension: stable, at goal  Blood Pressure: 124/74   Medication prescribed: valsartan  SE: denies     Hyperlipidemia: improved, stable, at goal  LDL: 47   Tri  Medication prescribed: rosuvastatin  SE: denies     Wt Readings from Last 3 Encounters:   25 (!) 306 lb (138.8 kg)   25 292 lb 12.8 oz (132.8 kg)   25 290 lb (131.5 kg)     BP Readings from Last 3 Encounters:   25 124/74   25 130/78   25 136/72          Past History:   He  has a past medical history of Diabetes (HCC) (2017?), High cholesterol, Obesity, PIERO (obstructive sleep apnea) (2020 [facility] HST), and Wears glasses.   His family history includes Diabetes in his mother; Heart  Attack in his maternal grandfather; Other in his mother.   He  reports that he has never smoked. He has never been exposed to tobacco smoke. He has never used smokeless tobacco. He reports that he does not currently use alcohol after a past usage of about 3.0 standard drinks of alcohol per week. He reports that he does not use drugs.     He is allergic to penicillins and lisinopril.     Current Outpatient Medications on File Prior to Visit   Medication Sig    VALSARTAN 160 MG Oral Tab TAKE 1 TABLET(160 MG) BY MOUTH DAILY    ROSUVASTATIN 10 MG Oral Tab TAKE 1 TABLET(10 MG) BY MOUTH EVERY NIGHT    Sildenafil Citrate 100 MG Oral Tab Take 1 tablet (100 mg total) by mouth daily as needed for Erectile Dysfunction.    PEG 3350-KCl-Na Bicarb-NaCl 420 g Oral Recon Soln Take as directed by physician.    Continuous Glucose Sensor (FREESTYLE GLENNY 3 PLUS SENSOR) Does not apply Misc 1 each every 15 (fifteen) days.    [DISCONTINUED] semaglutide (OZEMPIC, 1 MG/DOSE,) 4 MG/3ML Subcutaneous Solution Pen-injector Inject 1 mg into the skin once a week.     No current facility-administered medications on file prior to visit.       CMP  (most recent labs)   Lab Results   Component Value Date/Time    GLU 93 06/09/2025 09:58 AM    BUN 10 06/09/2025 09:58 AM    CREATSERUM 0.79 06/09/2025 09:58 AM    GFRNAA 95 12/14/2021 08:47 AM    GFRAA 110 12/14/2021 08:47 AM    EGFRCR 97 06/09/2025 09:58 AM    CA 9.7 06/09/2025 09:58 AM    ALKPHO 65 06/09/2025 09:58 AM    AST 23 06/09/2025 09:58 AM    ALT 27 06/09/2025 09:58 AM    BILT 0.3 06/09/2025 09:58 AM    TP 7.1 06/09/2025 09:58 AM    ALB 4.5 06/09/2025 09:58 AM     06/09/2025 09:58 AM    K 4.6 06/09/2025 09:58 AM     06/09/2025 09:58 AM    CO2 25.0 06/09/2025 09:58 AM           Lipids  (most recent labs)   Lab Results   Component Value Date/Time    CHOLEST 107 06/09/2025 09:58 AM    TRIG 140 06/09/2025 09:58 AM    HDL 36 (L) 06/09/2025 09:58 AM    LDL 47 06/09/2025 09:58 AM     NONHDLC 71 06/09/2025 09:58 AM          Diabetes  (most recent labs)   Lab Results   Component Value Date/Time    A1C 6.0 (H) 06/09/2025 09:58 AM          Microalb (most recent labs)   Lab Results   Component Value Date/Time    MICROALBCREA 114.7 (H) 06/09/2025 09:58 AM        Lab results reviewed with patient.    REVIEW OF SYSTEMS:   GENERAL HEALTH: feels well otherwise  SKIN: denies any unusual skin lesions or rashes  RESPIRATORY: denies shortness of breath with exertion  CARDIOVASCULAR: denies chest pain on exertion  GI: denies nausea, abdominal pain or heartburn  NEURO: denies headaches and denies numbness and tingling to extremities  ENDO: denies polydipsia, polyuria or polyphagia, denies unexplained weight changes    EXAM:   /74   Pulse 80   Resp 16   Wt (!) 306 lb (138.8 kg)   BMI 42.27 kg/m²  Estimated body mass index is 42.27 kg/m² as calculated from the following:    Height as of 3/12/25: 5' 11.34\" (1.812 m).    Weight as of this encounter: 306 lb (138.8 kg).   Physical Exam  Vitals reviewed.   Constitutional:       Appearance: Normal appearance.   Pulmonary:      Effort: Pulmonary effort is normal.   Neurological:      Mental Status: He is alert and oriented to person, place, and time.   Psychiatric:         Mood and Affect: Mood normal.         Behavior: Behavior normal.         ASSESSMENT AND PLAN:   As for his Diabetes, it is stable, improved, no significant medication side effects noted  Recommendations are: continue present meds, lose weight by increased dietary compliance and exercise, and check feet daily.    Patient to increase Ozempic to 2mg injection once weekly.    Per ADA guidelines, in patients with type 2 diabetes and established ASCVD, incorporating agent proven to reduce major adverse CV events and CV mortality   GLP-1 Ozempic rx chosen since it not only lowers A1C, but studies have shown it can also reduce the risk of major CV events such as heart attack, stroke, or death in  adults with type 2 diabetes who are currently treating their CV disease.  Patient to continue to use personal Freestyle Irwin 3 CGM for glucose monitoring.  Discussed self monitoring blood glucose and the importance of monitoring.  Patient agreed to monitoring bid - fasting and 2 hours postprandial of one meal per day if not using CGM.  Reinforced healthy eating in healthy portions and increasing daily physical activity.Reviewed ways to improve the protein in the urine:   Keep blood sugars in target range   Keep blood pressure in target range   Watch over the counter medicines like NSAID (non steroidal anti-inflammatory drugs) these can be harmful to  kidneys (examples include: , Motrin , Advil, ibuprofen, naprosyn, Aleve)   Continue ARB therapy - renoprotective         As for his hypertension, Blood Pressure is well controlled, stable, no significant medication side effects noted.   PLAN: will continue present medications, reviewed diet, exercise and weight control, continue ARB therapy.     As for his cholesterol, Lipids are stable, improved, no significant medication side effects noted.   PLAN: will continue present medications, reviewed diet, exercise and weight control, continue statin therapy.      DM Health Maintenance  Last dilated eye exam: Last Dilated Eye Exam: 25   Exam shows retinopathy? Eye Exam shows Diabetic Retinopathy?: Yes    Last diabetic foot exam: Last Foot Exam: 25    Date of last PHQ-2 depression screen: PHQ-2 - Date of last depression screenin2025    Patient  reports that he has never smoked. He has never been exposed to tobacco smoke. He has never used smokeless tobacco.  When is flu vaccine due? No recommendations at this time  When is pneumonia vaccine due? No recommendations at this time    The patient indicates understanding of these issues and agrees to the plan.  Refills addressed at time of office visit.    Diagnoses and all orders for this visit:    Type 2  diabetes mellitus with hyperglycemia, without long-term current use of insulin ([facility])  -     HemoCue Glucose 201 (Glucose blood test)  -     semaglutide (OZEMPIC, 2 MG/DOSE,) 8 MG/3ML Subcutaneous Solution Pen-injector; Inject 2 mg into the skin once a week.    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    The risks and benefits of my recommendations, as well as other treatment options were discussed with the patient today. Questions were answered to the best of my knowledge.   40 min spent with patient and >50% time spent counseling and coordinating care related to their office visit.      Mariya WEEKS, BC-ADM, [facility]

## 2025-06-23 RX ORDER — ROSUVASTATIN CALCIUM 10 MG/1
10 TABLET, COATED ORAL NIGHTLY
Qty: 90 TABLET | Refills: 0 | Status: SHIPPED | OUTPATIENT
Start: 2025-06-23

## 2025-06-23 NOTE — TELEPHONE ENCOUNTER
Protocol: Passed  Last Office Visit: 3/12/25  Labs: Completed in June  Last Refill: 3/30/25 #90  Return to Clinic:   Future Appointments   Date Time Provider Department Center   9/16/2025  8:15 AM Mariya Cook APRN [facility]

## (undated) DIAGNOSIS — E11.65 TYPE 2 DIABETES MELLITUS WITH HYPERGLYCEMIA, WITHOUT LONG-TERM CURRENT USE OF INSULIN (HCC): ICD-10-CM

## (undated) NOTE — LETTER
11/23/21        Harper Jeff  [address] [address]      Dear Ulisses Kevin,    [facility] records indicate that you have outstanding lab work and or testing that was ordered for you and has not yet been completed:  Orders Placed This Encount

## (undated) NOTE — LETTER
20    Patient: Lam Watt  : 1955 Visit date: 2020    Dear  Dr. Rodrigo Daly MD,    Thank you for referring Lam Alysa to my practice. Please find my assessment and plan below.            Assessment   Encounter for screening colono

## (undated) NOTE — LETTER
11/26/21        Lova Pour  [address]  [place] [address]      Dear Piper,    [address] [place] [address] records indicate that you have outstanding lab work and or testing that was ordered for you and has not yet been completed:  Orders Placed This Encount

## (undated) NOTE — LETTER
07/22/21        Gerrianne Flood  [address]  [place] [address]      Dear Zonia Morales,    [facility] records indicate that you have outstanding lab work and or testing that was ordered for you and has not yet been completed:  Orders Placed This Encount